# Patient Record
Sex: FEMALE | Race: WHITE | Employment: UNEMPLOYED | URBAN - METROPOLITAN AREA
[De-identification: names, ages, dates, MRNs, and addresses within clinical notes are randomized per-mention and may not be internally consistent; named-entity substitution may affect disease eponyms.]

---

## 2011-04-05 ASSESSMENT — PATIENT HEALTH QUESTIONNAIRE - PHQ9: SUM OF ALL RESPONSES TO PHQ QUESTIONS 1-9: 11

## 2017-02-13 ENCOUNTER — COMMUNICATION - HEALTHEAST (OUTPATIENT)
Dept: FAMILY MEDICINE | Facility: CLINIC | Age: 57
End: 2017-02-13

## 2017-02-13 DIAGNOSIS — F32.5 MAJOR DEPRESSIVE DISORDER, SINGLE EPISODE IN FULL REMISSION (H): ICD-10-CM

## 2017-02-13 DIAGNOSIS — F41.1 GENERALIZED ANXIETY DISORDER: ICD-10-CM

## 2017-02-14 ENCOUNTER — COMMUNICATION - HEALTHEAST (OUTPATIENT)
Dept: FAMILY MEDICINE | Facility: CLINIC | Age: 57
End: 2017-02-14

## 2017-02-14 DIAGNOSIS — F41.1 GENERALIZED ANXIETY DISORDER: ICD-10-CM

## 2017-02-27 ENCOUNTER — OFFICE VISIT - HEALTHEAST (OUTPATIENT)
Dept: FAMILY MEDICINE | Facility: CLINIC | Age: 57
End: 2017-02-27

## 2017-02-27 DIAGNOSIS — F41.1 GENERALIZED ANXIETY DISORDER: ICD-10-CM

## 2017-02-27 DIAGNOSIS — Z00.00 ROUTINE GENERAL MEDICAL EXAMINATION AT A HEALTH CARE FACILITY: ICD-10-CM

## 2017-02-27 DIAGNOSIS — F32.5 MAJOR DEPRESSIVE DISORDER, SINGLE EPISODE IN FULL REMISSION (H): ICD-10-CM

## 2017-02-27 DIAGNOSIS — E55.9 VITAMIN D DEFICIENCY: ICD-10-CM

## 2017-02-27 DIAGNOSIS — K51.90 ULCERATIVE COLITIS (H): ICD-10-CM

## 2017-02-27 DIAGNOSIS — M81.0 OSTEOPOROSIS: ICD-10-CM

## 2017-02-27 LAB
CHOLEST SERPL-MCNC: 225 MG/DL
FASTING STATUS PATIENT QL REPORTED: YES
HDLC SERPL-MCNC: 50 MG/DL
LDLC SERPL CALC-MCNC: 141 MG/DL
TRIGL SERPL-MCNC: 171 MG/DL

## 2017-02-27 ASSESSMENT — MIFFLIN-ST. JEOR: SCORE: 1217.25

## 2017-02-28 ENCOUNTER — COMMUNICATION - HEALTHEAST (OUTPATIENT)
Dept: FAMILY MEDICINE | Facility: CLINIC | Age: 57
End: 2017-02-28

## 2017-03-09 ENCOUNTER — RECORDS - HEALTHEAST (OUTPATIENT)
Dept: ADMINISTRATIVE | Facility: OTHER | Age: 57
End: 2017-03-09

## 2017-03-09 ENCOUNTER — RECORDS - HEALTHEAST (OUTPATIENT)
Dept: BONE DENSITY | Facility: CLINIC | Age: 57
End: 2017-03-09

## 2017-03-09 DIAGNOSIS — M81.0 AGE-RELATED OSTEOPOROSIS WITHOUT CURRENT PATHOLOGICAL FRACTURE: ICD-10-CM

## 2017-03-15 ENCOUNTER — COMMUNICATION - HEALTHEAST (OUTPATIENT)
Dept: FAMILY MEDICINE | Facility: CLINIC | Age: 57
End: 2017-03-15

## 2017-03-15 DIAGNOSIS — F41.1 GENERALIZED ANXIETY DISORDER: ICD-10-CM

## 2017-03-17 ENCOUNTER — COMMUNICATION - HEALTHEAST (OUTPATIENT)
Dept: FAMILY MEDICINE | Facility: CLINIC | Age: 57
End: 2017-03-17

## 2017-03-17 DIAGNOSIS — F41.1 GENERALIZED ANXIETY DISORDER: ICD-10-CM

## 2017-03-17 DIAGNOSIS — F32.5 MAJOR DEPRESSIVE DISORDER, SINGLE EPISODE IN FULL REMISSION (H): ICD-10-CM

## 2017-06-19 ENCOUNTER — COMMUNICATION - HEALTHEAST (OUTPATIENT)
Dept: FAMILY MEDICINE | Facility: CLINIC | Age: 57
End: 2017-06-19

## 2017-06-19 DIAGNOSIS — F32.5 MAJOR DEPRESSIVE DISORDER, SINGLE EPISODE IN FULL REMISSION (H): ICD-10-CM

## 2017-06-19 DIAGNOSIS — F41.1 GENERALIZED ANXIETY DISORDER: ICD-10-CM

## 2017-08-28 ENCOUNTER — OFFICE VISIT - HEALTHEAST (OUTPATIENT)
Dept: FAMILY MEDICINE | Facility: CLINIC | Age: 57
End: 2017-08-28

## 2017-08-28 DIAGNOSIS — R05.9 COUGH: ICD-10-CM

## 2017-09-05 ENCOUNTER — COMMUNICATION - HEALTHEAST (OUTPATIENT)
Dept: FAMILY MEDICINE | Facility: CLINIC | Age: 57
End: 2017-09-05

## 2017-09-05 DIAGNOSIS — F41.1 GENERALIZED ANXIETY DISORDER: ICD-10-CM

## 2017-10-30 ENCOUNTER — COMMUNICATION - HEALTHEAST (OUTPATIENT)
Dept: FAMILY MEDICINE | Facility: CLINIC | Age: 57
End: 2017-10-30

## 2017-10-30 DIAGNOSIS — F32.5 MAJOR DEPRESSIVE DISORDER, SINGLE EPISODE IN FULL REMISSION (H): ICD-10-CM

## 2017-10-30 DIAGNOSIS — F41.1 GENERALIZED ANXIETY DISORDER: ICD-10-CM

## 2018-03-13 ENCOUNTER — COMMUNICATION - HEALTHEAST (OUTPATIENT)
Dept: FAMILY MEDICINE | Facility: CLINIC | Age: 58
End: 2018-03-13

## 2018-03-13 DIAGNOSIS — F41.1 GENERALIZED ANXIETY DISORDER: ICD-10-CM

## 2018-04-16 ENCOUNTER — COMMUNICATION - HEALTHEAST (OUTPATIENT)
Dept: FAMILY MEDICINE | Facility: CLINIC | Age: 58
End: 2018-04-16

## 2018-04-16 DIAGNOSIS — F41.1 GENERALIZED ANXIETY DISORDER: ICD-10-CM

## 2018-04-26 ENCOUNTER — COMMUNICATION - HEALTHEAST (OUTPATIENT)
Dept: FAMILY MEDICINE | Facility: CLINIC | Age: 58
End: 2018-04-26

## 2018-04-26 DIAGNOSIS — F41.1 GENERALIZED ANXIETY DISORDER: ICD-10-CM

## 2018-04-26 DIAGNOSIS — F32.5 MAJOR DEPRESSIVE DISORDER, SINGLE EPISODE IN FULL REMISSION (H): ICD-10-CM

## 2018-05-29 ENCOUNTER — COMMUNICATION - HEALTHEAST (OUTPATIENT)
Dept: FAMILY MEDICINE | Facility: CLINIC | Age: 58
End: 2018-05-29

## 2018-05-29 DIAGNOSIS — F41.1 GENERALIZED ANXIETY DISORDER: ICD-10-CM

## 2018-07-02 ENCOUNTER — COMMUNICATION - HEALTHEAST (OUTPATIENT)
Dept: FAMILY MEDICINE | Facility: CLINIC | Age: 58
End: 2018-07-02

## 2018-07-02 DIAGNOSIS — F41.1 GENERALIZED ANXIETY DISORDER: ICD-10-CM

## 2018-07-26 ENCOUNTER — COMMUNICATION - HEALTHEAST (OUTPATIENT)
Dept: FAMILY MEDICINE | Facility: CLINIC | Age: 58
End: 2018-07-26

## 2018-07-26 DIAGNOSIS — F41.1 GENERALIZED ANXIETY DISORDER: ICD-10-CM

## 2018-07-26 DIAGNOSIS — F32.5 MAJOR DEPRESSIVE DISORDER, SINGLE EPISODE IN FULL REMISSION (H): ICD-10-CM

## 2018-10-30 ENCOUNTER — COMMUNICATION - HEALTHEAST (OUTPATIENT)
Dept: FAMILY MEDICINE | Facility: CLINIC | Age: 58
End: 2018-10-30

## 2018-10-30 DIAGNOSIS — F41.1 GENERALIZED ANXIETY DISORDER: ICD-10-CM

## 2018-10-30 DIAGNOSIS — F32.5 MAJOR DEPRESSIVE DISORDER, SINGLE EPISODE IN FULL REMISSION (H): ICD-10-CM

## 2018-12-19 ENCOUNTER — OFFICE VISIT - HEALTHEAST (OUTPATIENT)
Dept: FAMILY MEDICINE | Facility: CLINIC | Age: 58
End: 2018-12-19

## 2018-12-19 DIAGNOSIS — Z12.31 VISIT FOR SCREENING MAMMOGRAM: ICD-10-CM

## 2018-12-19 DIAGNOSIS — J06.9 VIRAL URI: ICD-10-CM

## 2018-12-19 LAB — DEPRECATED S PYO AG THROAT QL EIA: NORMAL

## 2018-12-19 ASSESSMENT — MIFFLIN-ST. JEOR: SCORE: 1273.04

## 2018-12-20 LAB — GROUP A STREP BY PCR: NORMAL

## 2019-06-06 ENCOUNTER — OFFICE VISIT - HEALTHEAST (OUTPATIENT)
Dept: FAMILY MEDICINE | Facility: CLINIC | Age: 59
End: 2019-06-06

## 2019-06-06 DIAGNOSIS — D22.71: ICD-10-CM

## 2019-06-06 DIAGNOSIS — L82.1 SEBORRHEIC KERATOSIS: ICD-10-CM

## 2019-06-06 DIAGNOSIS — F41.1 GENERALIZED ANXIETY DISORDER: ICD-10-CM

## 2019-06-06 DIAGNOSIS — F32.5 MAJOR DEPRESSIVE DISORDER, SINGLE EPISODE IN FULL REMISSION (H): ICD-10-CM

## 2019-06-06 DIAGNOSIS — K51.90 ULCERATIVE COLITIS WITHOUT COMPLICATIONS, UNSPECIFIED LOCATION (H): ICD-10-CM

## 2019-06-06 ASSESSMENT — MIFFLIN-ST. JEOR: SCORE: 1293

## 2019-12-24 ENCOUNTER — COMMUNICATION - HEALTHEAST (OUTPATIENT)
Dept: FAMILY MEDICINE | Facility: CLINIC | Age: 59
End: 2019-12-24

## 2019-12-24 DIAGNOSIS — F41.1 GENERALIZED ANXIETY DISORDER: ICD-10-CM

## 2020-01-22 ENCOUNTER — COMMUNICATION - HEALTHEAST (OUTPATIENT)
Dept: FAMILY MEDICINE | Facility: CLINIC | Age: 60
End: 2020-01-22

## 2020-01-22 DIAGNOSIS — F41.1 GENERALIZED ANXIETY DISORDER: ICD-10-CM

## 2020-03-18 ENCOUNTER — NURSE TRIAGE (OUTPATIENT)
Dept: NURSING | Facility: CLINIC | Age: 60
End: 2020-03-18

## 2020-03-18 ENCOUNTER — COMMUNICATION - HEALTHEAST (OUTPATIENT)
Dept: SCHEDULING | Facility: CLINIC | Age: 60
End: 2020-03-18

## 2020-03-23 ENCOUNTER — COMMUNICATION - HEALTHEAST (OUTPATIENT)
Dept: FAMILY MEDICINE | Facility: CLINIC | Age: 60
End: 2020-03-23

## 2020-03-31 ENCOUNTER — OFFICE VISIT - HEALTHEAST (OUTPATIENT)
Dept: FAMILY MEDICINE | Facility: CLINIC | Age: 60
End: 2020-03-31

## 2020-03-31 DIAGNOSIS — F41.1 GENERALIZED ANXIETY DISORDER: ICD-10-CM

## 2020-03-31 DIAGNOSIS — G47.00 INSOMNIA, UNSPECIFIED TYPE: ICD-10-CM

## 2020-03-31 DIAGNOSIS — F32.5 MAJOR DEPRESSIVE DISORDER, SINGLE EPISODE IN FULL REMISSION (H): ICD-10-CM

## 2020-03-31 ASSESSMENT — PATIENT HEALTH QUESTIONNAIRE - PHQ9: SUM OF ALL RESPONSES TO PHQ QUESTIONS 1-9: 4

## 2020-06-08 ENCOUNTER — COMMUNICATION - HEALTHEAST (OUTPATIENT)
Dept: FAMILY MEDICINE | Facility: CLINIC | Age: 60
End: 2020-06-08

## 2020-06-08 DIAGNOSIS — G47.00 INSOMNIA, UNSPECIFIED TYPE: ICD-10-CM

## 2020-06-19 ENCOUNTER — COMMUNICATION - HEALTHEAST (OUTPATIENT)
Dept: FAMILY MEDICINE | Facility: CLINIC | Age: 60
End: 2020-06-19

## 2020-06-19 DIAGNOSIS — G47.00 INSOMNIA, UNSPECIFIED TYPE: ICD-10-CM

## 2020-06-26 ENCOUNTER — COMMUNICATION - HEALTHEAST (OUTPATIENT)
Dept: FAMILY MEDICINE | Facility: CLINIC | Age: 60
End: 2020-06-26

## 2020-06-26 DIAGNOSIS — G47.00 INSOMNIA, UNSPECIFIED TYPE: ICD-10-CM

## 2020-06-30 ENCOUNTER — COMMUNICATION - HEALTHEAST (OUTPATIENT)
Dept: FAMILY MEDICINE | Facility: CLINIC | Age: 60
End: 2020-06-30

## 2020-07-20 ENCOUNTER — COMMUNICATION - HEALTHEAST (OUTPATIENT)
Dept: SCHEDULING | Facility: CLINIC | Age: 60
End: 2020-07-20

## 2020-07-20 ENCOUNTER — OFFICE VISIT - HEALTHEAST (OUTPATIENT)
Dept: FAMILY MEDICINE | Facility: CLINIC | Age: 60
End: 2020-07-20

## 2020-07-20 ENCOUNTER — COMMUNICATION - HEALTHEAST (OUTPATIENT)
Dept: FAMILY MEDICINE | Facility: CLINIC | Age: 60
End: 2020-07-20

## 2020-07-20 DIAGNOSIS — Z12.31 VISIT FOR SCREENING MAMMOGRAM: ICD-10-CM

## 2020-07-20 DIAGNOSIS — N39.0 URINARY TRACT INFECTION WITHOUT HEMATURIA, SITE UNSPECIFIED: ICD-10-CM

## 2020-07-20 DIAGNOSIS — R26.9 GAIT DISTURBANCE: ICD-10-CM

## 2020-07-20 DIAGNOSIS — E55.9 VITAMIN D DEFICIENCY: ICD-10-CM

## 2020-07-20 DIAGNOSIS — R32 URINARY INCONTINENCE, UNSPECIFIED TYPE: ICD-10-CM

## 2020-07-20 DIAGNOSIS — M62.81 PROXIMAL MUSCLE WEAKNESS: ICD-10-CM

## 2020-07-20 LAB
ALBUMIN SERPL-MCNC: 3.6 G/DL (ref 3.5–5)
ALBUMIN UR-MCNC: NEGATIVE MG/DL
ALP SERPL-CCNC: 85 U/L (ref 45–120)
ALT SERPL W P-5'-P-CCNC: <9 U/L (ref 0–45)
ANION GAP SERPL CALCULATED.3IONS-SCNC: 11 MMOL/L (ref 5–18)
APPEARANCE UR: ABNORMAL
AST SERPL W P-5'-P-CCNC: 13 U/L (ref 0–40)
BACTERIA #/AREA URNS HPF: ABNORMAL HPF
BILIRUB SERPL-MCNC: 0.1 MG/DL (ref 0–1)
BILIRUB UR QL STRIP: NEGATIVE
BUN SERPL-MCNC: 21 MG/DL (ref 8–22)
CALCIUM SERPL-MCNC: 9.2 MG/DL (ref 8.5–10.5)
CHLORIDE BLD-SCNC: 103 MMOL/L (ref 98–107)
CO2 SERPL-SCNC: 25 MMOL/L (ref 22–31)
COLOR UR AUTO: YELLOW
CREAT SERPL-MCNC: 0.77 MG/DL (ref 0.6–1.1)
ERYTHROCYTE [DISTWIDTH] IN BLOOD BY AUTOMATED COUNT: 13 % (ref 11–14.5)
ERYTHROCYTE [SEDIMENTATION RATE] IN BLOOD BY WESTERGREN METHOD: 16 MM/HR (ref 0–20)
GFR SERPL CREATININE-BSD FRML MDRD: >60 ML/MIN/1.73M2
GLUCOSE BLD-MCNC: 97 MG/DL (ref 70–125)
GLUCOSE UR STRIP-MCNC: NEGATIVE MG/DL
HCT VFR BLD AUTO: 41.2 % (ref 35–47)
HGB BLD-MCNC: 13.4 G/DL (ref 12–16)
HGB UR QL STRIP: NEGATIVE
KETONES UR STRIP-MCNC: ABNORMAL MG/DL
LEUKOCYTE ESTERASE UR QL STRIP: ABNORMAL
MAGNESIUM SERPL-MCNC: 1.9 MG/DL (ref 1.8–2.6)
MCH RBC QN AUTO: 31.8 PG (ref 27–34)
MCHC RBC AUTO-ENTMCNC: 32.5 G/DL (ref 32–36)
MCV RBC AUTO: 98 FL (ref 80–100)
MUCOUS THREADS #/AREA URNS LPF: ABNORMAL LPF
NITRATE UR QL: NEGATIVE
PH UR STRIP: 5.5 [PH] (ref 4.5–8)
PLATELET # BLD AUTO: 301 THOU/UL (ref 140–440)
PMV BLD AUTO: 12.2 FL (ref 8.5–12.5)
POTASSIUM BLD-SCNC: 4.4 MMOL/L (ref 3.5–5)
PROT SERPL-MCNC: 7.1 G/DL (ref 6–8)
RBC # BLD AUTO: 4.21 MILL/UL (ref 3.8–5.4)
RBC #/AREA URNS AUTO: ABNORMAL HPF
SODIUM SERPL-SCNC: 139 MMOL/L (ref 136–145)
SP GR UR STRIP: 1.02 (ref 1–1.03)
SQUAMOUS #/AREA URNS AUTO: ABNORMAL LPF
TSH SERPL DL<=0.005 MIU/L-ACNC: 2.9 UIU/ML (ref 0.3–5)
UROBILINOGEN UR STRIP-ACNC: ABNORMAL
VIT B12 SERPL-MCNC: 532 PG/ML (ref 213–816)
WBC #/AREA URNS AUTO: ABNORMAL HPF
WBC: 11.7 THOU/UL (ref 4–11)

## 2020-07-20 ASSESSMENT — MIFFLIN-ST. JEOR: SCORE: 1223.15

## 2020-07-21 LAB
25(OH)D3 SERPL-MCNC: 23.2 NG/ML (ref 30–80)
25(OH)D3 SERPL-MCNC: 23.2 NG/ML (ref 30–80)
B BURGDOR IGG+IGM SER QL: 0.05 INDEX VALUE
T PALLIDUM AB SER QL: NEGATIVE

## 2020-07-22 ENCOUNTER — COMMUNICATION - HEALTHEAST (OUTPATIENT)
Dept: FAMILY MEDICINE | Facility: CLINIC | Age: 60
End: 2020-07-22

## 2020-07-22 LAB — BACTERIA SPEC CULT: ABNORMAL

## 2020-07-27 ENCOUNTER — COMMUNICATION - HEALTHEAST (OUTPATIENT)
Dept: SCHEDULING | Facility: CLINIC | Age: 60
End: 2020-07-27

## 2020-07-27 ENCOUNTER — NURSE TRIAGE (OUTPATIENT)
Dept: NURSING | Facility: CLINIC | Age: 60
End: 2020-07-27

## 2020-07-27 NOTE — TELEPHONE ENCOUNTER
7/20/20 Seen pcp Dr. Mcdonough in [facility] chart for difficulty swallowing.        7/20/20 4:38 PM   Katia Connors,     Dr. Ave Mcdonough has placed an order for you to see [facility] Mammography, [facility] Imaging/MRI and [facility] Neurology. An order has been sent to [facility] Mammography, [facility] Imaging and [facility] Neurology. They will be reaching out to you to schedule these appointments however if you do not hear from them within 5-7 business days, feel free to give them a call to schedule. Their contact number is [phone] ([facility] Mammmography and Imaging) and [phone] ([facility] Neurology).      Thank you,     [facility] Clinic Staff    Patient is also scheduled with [facility] for Aug 3, 2020 [facility] MRI Brain w/wo contrast    Pamella WANG [facility] Nurse Advisor     intermittent/gradual onset

## 2020-08-03 ENCOUNTER — HOSPITAL ENCOUNTER (OUTPATIENT)
Dept: MRI IMAGING | Facility: CLINIC | Age: 60
Discharge: HOME | End: 2020-08-03
Attending: FAMILY MEDICINE

## 2020-08-03 DIAGNOSIS — M62.81 PROXIMAL MUSCLE WEAKNESS: ICD-10-CM

## 2020-08-03 DIAGNOSIS — R26.9 GAIT DISTURBANCE: ICD-10-CM

## 2020-08-05 ENCOUNTER — AMBULATORY - HEALTHEAST (OUTPATIENT)
Dept: NEUROLOGY | Facility: CLINIC | Age: 60
End: 2020-08-05

## 2020-08-05 ENCOUNTER — RECORDS - HEALTHEAST (OUTPATIENT)
Dept: ADMINISTRATIVE | Facility: OTHER | Age: 60
End: 2020-08-05

## 2020-08-05 ENCOUNTER — OFFICE VISIT (OUTPATIENT)
Dept: NEUROLOGY | Facility: CLINIC | Age: 60
End: 2020-08-05
Payer: COMMERCIAL

## 2020-08-05 VITALS — BODY MASS INDEX: 22.5 KG/M2 | WEIGHT: 140 LBS | HEIGHT: 66 IN

## 2020-08-05 DIAGNOSIS — R41.3 MEMORY DIFFICULTIES: ICD-10-CM

## 2020-08-05 DIAGNOSIS — R90.89 ABNORMAL BRAIN MRI: Primary | ICD-10-CM

## 2020-08-05 DIAGNOSIS — G50.1 ATYPICAL FACIAL PAIN: ICD-10-CM

## 2020-08-05 DIAGNOSIS — Z11.59 ENCOUNTER FOR SCREENING FOR OTHER VIRAL DISEASES: ICD-10-CM

## 2020-08-05 DIAGNOSIS — G35 MULTIPLE SCLEROSIS (H): ICD-10-CM

## 2020-08-05 DIAGNOSIS — F32.5 MAJOR DEPRESSIVE DISORDER, SINGLE EPISODE IN FULL REMISSION (H): ICD-10-CM

## 2020-08-05 PROBLEM — E55.9 VITAMIN D DEFICIENCY: Status: ACTIVE | Noted: 2020-08-05

## 2020-08-05 PROCEDURE — 99205 OFFICE O/P NEW HI 60 MIN: CPT | Mod: 95 | Performed by: PSYCHIATRY & NEUROLOGY

## 2020-08-05 RX ORDER — ZINC GLUCONATE 50 MG
50 TABLET ORAL DAILY
COMMUNITY

## 2020-08-05 RX ORDER — VENLAFAXINE HYDROCHLORIDE 75 MG/1
150 CAPSULE, EXTENDED RELEASE ORAL DAILY
COMMUNITY
Start: 2020-03-31

## 2020-08-05 RX ORDER — NAPROXEN SODIUM 220 MG
TABLET ORAL
COMMUNITY

## 2020-08-05 RX ORDER — BUPROPION HYDROCHLORIDE 150 MG/1
TABLET, EXTENDED RELEASE ORAL
COMMUNITY
Start: 2020-03-31

## 2020-08-05 RX ORDER — TRAZODONE HYDROCHLORIDE 50 MG/1
TABLET, FILM COATED ORAL
COMMUNITY
Start: 2020-06-28

## 2020-08-05 ASSESSMENT — MIFFLIN-ST. JEOR: SCORE: 1226.79

## 2020-08-05 NOTE — PROGRESS NOTES
NEUROLOGY NOTE        Assessment/Plan        Abnormal brain MRI.  Most suggestive of multiple sclerosis.  Has a family history of multiple sclerosis in her mom.    Memory difficulties    New headaches, atypical facial pain.    Recurrent major depression in control now.    Recommendations:  MRI of thoracic and cervical spine with and without contrast  Neuropsych testing  Blood tests of thiamine and HIV to rule out other complicating factors.  Follow-up in about 6 weeks when all results are available.      This is a virtual visit due to COVID-19 Pandemic to mitigate potential disease spreading. Consent obtained for charge with this visit. Total time spent about 40 minutes.           SUBJECTIVE       Dory Abrams is a 60 year old female seen for abnormal brain MRI.      Over the past 1 year since 2019, she has been experiencing gait difficulty and poor balance.  She has been falling total of 5-6 times from February to August 2020 mainly due to imbalance.  Only 1 time she felt dizzy and lightheaded feeling like she is going to pass out otherwise no loss of consciousness.  Other times there is no warning, she falls mainly due to lack of balance.  Sometimes her arms and legs move spontaneously without her control.  She has poor coordination and some tremors in hands.  She feels the symptoms every day.  No focal weakness or numbness.  No vision changes.  No loss of vision.  No focal weakness or sensory complaints.  Does not vaguely feel lightheaded or dizzy when standing up.    Headaches noted since February 2020: She has some ice headache across the nasal bridge between her eyes.  It comes and goes no time pattern or position changes.  No trigger.  No nausea vomiting, no sensitivity to light or sound.  Sometimes lasting up to hours.    Memory difficulty since 2019.  Her brain not working as good as in the past.  She forgets things and loses train of thoughts.  No issue to manage her daily routine yet.  She is laid  "off since January when her company closed down.  She was a manager.    History of major depression under control on venlafaxine and Wellbutrin.  No suicidal ideation.  She also has anxiety she feels controlled.    He smokes 5 cigarettes a day.  No alcohol abuse.    Her mom has a history of multiple sclerosis.    She is eating healthy and maintained pretty active taking care of herself.  Sleeping is okay.    Her lipids were a little elevated in 2017.    Result Impression8/2/2020   1.  No evidence of acute intracranial hemorrhage, mass effect, or infarction.  2.  Extensive focal and confluent areas of signal abnormality within the cerebral hemispheric white matter and within the brainstem which are nonspecific, though most commonly ascribed to chronic small vessel ischemic disease, more than expected for age.   Other diagnostic considerations include sequela of migraine headaches or multiple sclerosis. Although multiple sclerosis is on the differential, the distribution of signal abnormality do not favor multiple sclerosis though isn't entirely excluded based   on imaging alone.   3.  Mild brain parenchymal volume loss.     In 7/2020 had a blood tests including Lyme antibody cascade, sh, liver function syphilis antibody, BMP, CBC and UA unremarkable.  Vitamin D was a little low.         Review of system     10 point system review otherwise unremarkable    PHYSICAL EXAMINATION     Vital signs in last 24 hours:  Vitals:    08/05/20 1141   Weight: 63.5 kg (140 lb)   Height: 1.676 m (5' 6\")       She seems to have normal mental status, language and speech.  Well-developed and nourished.  Atraumatic.  Exam of the head, neck, eyes, ears, nose and mouth are adequate.  Cranial nerves II through XII largely intact.  She has poor coordination.  Reports no focal weakness.  Not able to do other assessment with virtual visit.        Problem List     Patient Active Problem List    Diagnosis Date Noted     Major depressive disorder, " single episode in full remission (H) 08/05/2020     Priority: Medium     Created by Conversion       Vitamin D deficiency 08/05/2020     Priority: Medium     Created by Conversion    Replacement Utility updated for latest IMO load       Osteoporosis 02/19/2015     Priority: Medium         Past medical history     Depression,anxiety    Family history     Family History   Problem Relation Age of Onset     Diabetes Father      Diabetes Sister      Mother:MS    Social history     Social History     Socioeconomic History     Marital status:      Spouse name: Not on file     Number of children: Not on file     Years of education: Not on file     Highest education level: Not on file   Occupational History     Not on file   Social Needs     Financial resource strain: Not on file     Food insecurity     Worry: Not on file     Inability: Not on file     Transportation needs     Medical: Not on file     Non-medical: Not on file   Tobacco Use     Smoking status: Current Every Day Smoker     Packs/day: 0.25     Years: 41.00     Pack years: 10.25     Smokeless tobacco: Never Used   Substance and Sexual Activity     Alcohol use: Not on file     Drug use: Not on file     Sexual activity: Not on file   Lifestyle     Physical activity     Days per week: Not on file     Minutes per session: Not on file     Stress: Not on file   Relationships     Social connections     Talks on phone: Not on file     Gets together: Not on file     Attends Jew service: Not on file     Active member of club or organization: Not on file     Attends meetings of clubs or organizations: Not on file     Relationship status: Not on file     Intimate partner violence     Fear of current or ex partner: Not on file     Emotionally abused: Not on file     Physically abused: Not on file     Forced sexual activity: Not on file   Other Topics Concern     Parent/sibling w/ CABG, MI or angioplasty before 65F 55M? Not Asked   Social History Narrative      Not on file         Allergy     Robitussin night time and Aspirin    MEDICATIONS List     Current Outpatient Medications   Medication Sig Dispense Refill     Ascorbic Acid (VITAMIN C PO) Take 500 mg by mouth       buPROPion (WELLBUTRIN SR) 150 MG 12 hr tablet TAKE 1 TABLET BY MOUTH TWICE DAILY. OFFICE VISIT REQUIRED FOR FURTHER REFILLS       calcium carbonate-vitamin D (OS-JEANETTE) 500-400 MG-UNIT tablet Take 1 tablet by mouth daily       Cholecalciferol (VITAMIN D3 PO) Take 2,000 Units by mouth       GLUCOSAMINE-CHONDROITIN PO        naproxen sodium (ANAPROX) 220 MG tablet as needed.       traZODone (DESYREL) 50 MG tablet Take between 1-2 tablets ( mg) by mouth at bedtime.       venlafaxine (EFFEXOR-XR) 75 MG 24 hr capsule Take 150 mg by mouth daily       zinc gluconate 50 MG tablet Take 50 mg by mouth daily                     Johanna Padron MD, MD, PhD  Neurology   Office tel: [phone]

## 2020-08-05 NOTE — LETTER
8/5/2020         RE: Dory Abrams  [address]  [place] [address]        Dear Colleague,    Thank you for referring your patient, Dory Abrams, to the [facility] [place]. Please see a copy of my visit note below.        NEUROLOGY NOTE        Assessment/Plan        Abnormal brain MRI.  Most suggestive of multiple sclerosis.  Has a family history of multiple sclerosis in her mom.    Memory difficulties    New headaches, atypical facial pain.    Recurrent major depression in control now.    Recommendations:  MRI of thoracic and cervical spine with and without contrast  Neuropsych testing  Blood tests of thiamine and HIV to rule out other complicating factors.  Follow-up in about 6 weeks when all results are available.      This is a virtual visit due to COVID-19 Pandemic to mitigate potential disease spreading. Consent obtained for charge with this visit. Total time spent about 40 minutes.           SUBJECTIVE       Dory Abrams is a 60 year old female seen for abnormal brain MRI.      Over the past 1 year since 2019, she has been experiencing gait difficulty and poor balance.  She has been falling total of 5-6 times from February to August 2020 mainly due to imbalance.  Only 1 time she felt dizzy and lightheaded feeling like she is going to pass out otherwise no loss of consciousness.  Other times there is no warning, she falls mainly due to lack of balance.  Sometimes her arms and legs move spontaneously without her control.  She has poor coordination and some tremors in hands.  She feels the symptoms every day.  No focal weakness or numbness.  No vision changes.  No loss of vision.  No focal weakness or sensory complaints.  Does not vaguely feel lightheaded or dizzy when standing up.    Headaches noted since February 2020: She has some ice headache across the nasal bridge between her eyes.  It comes and goes no time pattern or position changes.  No trigger.  No nausea  "vomiting, no sensitivity to light or sound.  Sometimes lasting up to hours.    Memory difficulty since 2019.  Her brain not working as good as in the past.  She forgets things and loses train of thoughts.  No issue to manage her daily routine yet.  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    History of major depression under control on venlafaxine and Wellbutrin.  No suicidal ideation.  She also has anxiety she feels controlled.    He smokes 5 cigarettes a day.  No alcohol abuse.    Her mom has a history of multiple sclerosis.    She is eating healthy and maintained pretty active taking care of herself.  Sleeping is okay.    Her lipids were a little elevated in 2017.    Result Impression8/2/2020   1.  No evidence of acute intracranial hemorrhage, mass effect, or infarction.  2.  Extensive focal and confluent areas of signal abnormality within the cerebral hemispheric white matter and within the brainstem which are nonspecific, though most commonly ascribed to chronic small vessel ischemic disease, more than expected for age.   Other diagnostic considerations include sequela of migraine headaches or multiple sclerosis. Although multiple sclerosis is on the differential, the distribution of signal abnormality do not favor multiple sclerosis though isn't entirely excluded based   on imaging alone.   3.  Mild brain parenchymal volume loss.     In 7/2020 had a blood tests including Lyme antibody cascade, sh, liver function syphilis antibody, BMP, CBC and UA unremarkable.  Vitamin D was a little low.         Review of system     10 point system review otherwise unremarkable    PHYSICAL EXAMINATION     Vital signs in last 24 hours:  Vitals:    08/05/20 1141   Weight: 63.5 kg (140 lb)   Height: 1.676 m (5' 6\")       She seems to have normal mental status, language and speech.  Well-developed and nourished.  Atraumatic.  Exam of the head, neck, eyes, ears, nose and mouth are adequate.  " Cranial nerves II through XII largely intact.  She has poor coordination.  Reports no focal weakness.  Not able to do other assessment with virtual visit.        Problem List     Patient Active Problem List    Diagnosis Date Noted     Major depressive disorder, single episode in full remission (H) 08/05/2020     Priority: Medium     Created by Conversion       Vitamin D deficiency 08/05/2020     Priority: Medium     Created by Conversion    Replacement Utility updated for latest IMO load       Osteoporosis 02/19/2015     Priority: Medium         Past medical history     Depression,anxiety    Family history     Family History   Problem Relation Age of Onset     Diabetes Father      Diabetes Sister      Mother:MS    Social history     Social History     Socioeconomic History     Marital status:      Spouse name: Not on file     Number of children: Not on file     Years of education: Not on file     Highest education level: Not on file   Occupational History     Not on file   Social Needs     Financial resource strain: Not on file     Food insecurity     Worry: Not on file     Inability: Not on file     Transportation needs     Medical: Not on file     Non-medical: Not on file   Tobacco Use     Smoking status: Current Every Day Smoker     Packs/day: 0.25     Years: 41.00     Pack years: 10.25     Smokeless tobacco: Never Used   Substance and Sexual Activity     Alcohol use: Not on file     Drug use: Not on file     Sexual activity: Not on file   Lifestyle     Physical activity     Days per week: Not on file     Minutes per session: Not on file     Stress: Not on file   Relationships     Social connections     Talks on phone: Not on file     Gets together: Not on file     Attends Adventism service: Not on file     Active member of club or organization: Not on file     Attends meetings of clubs or organizations: Not on file     Relationship status: Not on file     Intimate partner violence     Fear of current or  ex partner: Not on file     Emotionally abused: Not on file     Physically abused: Not on file     Forced sexual activity: Not on file   Other Topics Concern     Parent/sibling w/ CABG, MI or angioplasty before 65F 55M? Not Asked   Social History Narrative     Not on file         Allergy     Robitussin night time and Aspirin    MEDICATIONS List     Current Outpatient Medications   Medication Sig Dispense Refill     Ascorbic Acid (VITAMIN C PO) Take 500 mg by mouth       buPROPion (WELLBUTRIN SR) 150 MG 12 hr tablet TAKE 1 TABLET BY MOUTH TWICE DAILY. OFFICE VISIT REQUIRED FOR FURTHER REFILLS       calcium carbonate-vitamin D (OS-JEANETTE) 500-400 MG-UNIT tablet Take 1 tablet by mouth daily       Cholecalciferol (VITAMIN D3 PO) Take 2,000 Units by mouth       GLUCOSAMINE-CHONDROITIN PO        naproxen sodium (ANAPROX) 220 MG tablet as needed.       traZODone (DESYREL) 50 MG tablet Take between 1-2 tablets ( mg) by mouth at bedtime.       venlafaxine (EFFEXOR-XR) 75 MG 24 hr capsule Take 150 mg by mouth daily       zinc gluconate 50 MG tablet Take 50 mg by mouth daily                     Johanna Padron MD, MD, PhD  Neurology   Office tel: [phone]        Again, thank you for allowing me to participate in the care of your patient.        Sincerely,        Johanna Padron MD

## 2020-08-05 NOTE — NURSING NOTE
Chief Complaint   Patient presents with     Neurologic Problem     GAIT/MUSCLE WEAKNESS     Video Visit     - VIDEO VISIT text to smart phone [phone]     Johnnie Ron MA on 8/5/2020 at 11:51 AM

## 2020-08-06 ENCOUNTER — RECORDS - HEALTHEAST (OUTPATIENT)
Dept: LAB | Facility: CLINIC | Age: 60
End: 2020-08-06

## 2020-08-06 LAB
C REACTIVE PROTEIN LHE: 0.5 MG/DL (ref 0–0.8)
ERYTHROCYTE [SEDIMENTATION RATE] IN BLOOD BY WESTERGREN METHOD: 10 MM/HR (ref 0–20)
HIV 1+2 AB+HIV1 P24 AG SERPL QL IA: NEGATIVE

## 2020-08-07 ENCOUNTER — AMBULATORY - HEALTHEAST (OUTPATIENT)
Dept: FAMILY MEDICINE | Facility: CLINIC | Age: 60
End: 2020-08-07

## 2020-08-07 ENCOUNTER — RECORDS - HEALTHEAST (OUTPATIENT)
Dept: ADMINISTRATIVE | Facility: OTHER | Age: 60
End: 2020-08-07

## 2020-08-07 DIAGNOSIS — Z11.59 ENCOUNTER FOR SCREENING FOR OTHER VIRAL DISEASES: ICD-10-CM

## 2020-08-08 LAB
SARS-COV-2 PCR COMMENT: NORMAL
SARS-COV-2 RNA SPEC QL NAA+PROBE: NEGATIVE
SARS-COV-2 VIRUS SPECIMEN SOURCE: NORMAL

## 2020-08-09 ENCOUNTER — COMMUNICATION - HEALTHEAST (OUTPATIENT)
Dept: SCHEDULING | Facility: CLINIC | Age: 60
End: 2020-08-09

## 2020-08-09 LAB — VIT B1 PYROPHOSHATE BLD-SCNC: 125 NMOL/L (ref 70–180)

## 2020-08-10 ENCOUNTER — HOSPITAL ENCOUNTER (OUTPATIENT)
Dept: RADIOLOGY | Facility: CLINIC | Age: 60
Discharge: HOME | End: 2020-08-10
Attending: PSYCHIATRY & NEUROLOGY | Admitting: RADIOLOGY

## 2020-08-10 DIAGNOSIS — G35 MS (MULTIPLE SCLEROSIS) (H): ICD-10-CM

## 2020-08-10 LAB
APPEARANCE CSF: CLEAR
COLOR CSF: COLORLESS
GLUCOSE CSF-MCNC: 64 MG/DL (ref 40–75)
PROT CSF-MCNC: 35 MG/DL (ref 15–45)
RBC # CSF MANUAL: 1 /UL
TUBE # CSF: 4
VOLUME CSF - HISTORICAL: 7 ML
WBC # CSF MANUAL: 1 /UL

## 2020-08-11 LAB — ARUP MISCELLANEOUS TEST: NORMAL

## 2020-08-12 ENCOUNTER — HOSPITAL ENCOUNTER (OUTPATIENT)
Dept: MRI IMAGING | Facility: CLINIC | Age: 60
Discharge: HOME | End: 2020-08-12
Attending: PSYCHIATRY & NEUROLOGY

## 2020-08-12 DIAGNOSIS — R90.89 ABNORMAL BRAIN MRI: ICD-10-CM

## 2020-08-12 DIAGNOSIS — G35 MS (MULTIPLE SCLEROSIS) (H): ICD-10-CM

## 2020-08-12 LAB
ALB CSF/SERPL: 5.6 RATIO (ref 0–9)
ALBUMIN CSF-MCNC: 19 MG/DL (ref 0–35)
ALBUMIN SERPL-MCNC: 3400 MG/DL (ref 3500–5200)
IGG CSF-MCNC: 3.1 MG/DL (ref 0–6)
IGG SERPL-MCNC: 1050 MG/DL (ref 768–1632)
IGG SYNTH RATE SER+CSF CALC-MRATE: <0 MG/D
IGG/ALB CLEAR SER+CSF-RTO: 0.53 RATIO (ref 0.28–0.66)
IGG/ALB CSF: 0.16 RATIO (ref 0.09–0.25)
MISCELLANEOUS TEST DEPT. - HE HISTORICAL: NORMAL
OLIGOCLONAL BANDS CSF ELPH-IMP: ABNORMAL
OLIGOCLONAL BANDS CSF ELPH-IMP: NEGATIVE
OLIGOCLONAL BANDS CSF IEF: 0 BANDS (ref 0–1)
PERFORMING LAB: NORMAL
SPECIMEN STATUS: NORMAL
TEST NAME: NORMAL

## 2020-08-14 ENCOUNTER — TELEPHONE (OUTPATIENT)
Dept: NEUROLOGY | Facility: CLINIC | Age: 60
End: 2020-08-14

## 2020-08-17 NOTE — TELEPHONE ENCOUNTER
Hi Dory Monroe called to let you she did her LP and MRI and the results are in Care Everywhere [facility].  Josefina Daley on 8/17/2020 at 3:35 PM

## 2020-08-18 NOTE — TELEPHONE ENCOUNTER
Please inform patient the CSF study, blood test and MRI spine so far unremarkable.  Will discuss in detail at next visit as scheduled.  Thank you

## 2020-08-28 ENCOUNTER — AMBULATORY - HEALTHEAST (OUTPATIENT)
Dept: BEHAVIORAL HEALTH | Facility: CLINIC | Age: 60
End: 2020-08-28

## 2020-08-28 DIAGNOSIS — R41.3 MEMORY DIFFICULTIES: ICD-10-CM

## 2020-08-28 DIAGNOSIS — R90.89 ABNORMAL BRAIN MRI: ICD-10-CM

## 2020-08-28 DIAGNOSIS — G35 MULTIPLE SCLEROSIS (H): ICD-10-CM

## 2020-09-09 ENCOUNTER — AMBULATORY - HEALTHEAST (OUTPATIENT)
Dept: BEHAVIORAL HEALTH | Facility: CLINIC | Age: 60
End: 2020-09-09

## 2020-09-09 DIAGNOSIS — G35 MULTIPLE SCLEROSIS (H): ICD-10-CM

## 2020-09-09 DIAGNOSIS — R90.89 ABNORMAL BRAIN MRI: ICD-10-CM

## 2020-09-09 DIAGNOSIS — R41.3 MEMORY DIFFICULTIES: ICD-10-CM

## 2020-09-10 ENCOUNTER — COMMUNICATION - HEALTHEAST (OUTPATIENT)
Dept: FAMILY MEDICINE | Facility: CLINIC | Age: 60
End: 2020-09-10

## 2020-09-10 DIAGNOSIS — G47.00 INSOMNIA, UNSPECIFIED TYPE: ICD-10-CM

## 2020-09-16 ENCOUNTER — RECORDS - HEALTHEAST (OUTPATIENT)
Dept: ADMINISTRATIVE | Facility: OTHER | Age: 60
End: 2020-09-16

## 2020-09-16 ENCOUNTER — OFFICE VISIT (OUTPATIENT)
Dept: NEUROLOGY | Facility: CLINIC | Age: 60
End: 2020-09-16
Payer: COMMERCIAL

## 2020-09-16 VITALS
SYSTOLIC BLOOD PRESSURE: 128 MMHG | DIASTOLIC BLOOD PRESSURE: 79 MMHG | HEART RATE: 81 BPM | WEIGHT: 140 LBS | HEIGHT: 66 IN | BODY MASS INDEX: 22.5 KG/M2

## 2020-09-16 DIAGNOSIS — R27.0 ATAXIA: Primary | ICD-10-CM

## 2020-09-16 DIAGNOSIS — R41.3 MEMORY DIFFICULTIES: ICD-10-CM

## 2020-09-16 DIAGNOSIS — G35 MULTIPLE SCLEROSIS (H): ICD-10-CM

## 2020-09-16 PROCEDURE — 99214 OFFICE O/P EST MOD 30 MIN: CPT | Performed by: PSYCHIATRY & NEUROLOGY

## 2020-09-16 ASSESSMENT — MIFFLIN-ST. JEOR: SCORE: 1226.79

## 2020-09-16 NOTE — LETTER
9/16/2020         RE: Dory bArams  [address]  [place] [address]        Dear Colleague,    Thank you for referring your patient, Dory Abrams, to the [facility] [place]. Please see a copy of my visit note below.        NEUROLOGY NOTE        Assessment/Plan          Abnormal brain MRI.  Most suggestive of multiple sclerosis.  Has a family history of multiple sclerosis in her mom.     Memory difficulties     New headaches, atypical facial pain.     Recurrent major depression in control now.     Recommendations:  Neuropsych testing  For for therapy  Follow-up in about 6 weeks    Check paraneoplastic panel,   Check Lyme and ANCA for the abnormal brain MRI finding.  Commend to discontinue zinc supplement.  Consider checking anti-shayy, vitamin E levels.  Consider a repeat MRI study in about 3 months with and without contrast.  We will see her for follow-up in about 3 months.       SUBJECTIVE           Dory Abrams is a 60 year old female seen for abnormal brain MRI.       MRI thoracic and cervical spine unremarkable.  CSF study unremarkable and negative for myelin basic protein and oligoclonal bands.  ESR CRP, vitamin B12, diamine, TSH, liver function, BMP and CBC unremarkable.    Over the past 1 year since 2019, she has been experiencing gait difficulty and poor balance.  She has been falling total of 5-6 times from February to August 2020 mainly due to imbalance.  Only 1 time she felt dizzy and lightheaded feeling like she is going to pass out otherwise no loss of consciousness.  Other times there is no warning, she falls mainly due to lack of balance.  Sometimes her arms and legs move spontaneously without her control.  She has poor coordination and some tremors in hands.  She feels the symptoms every day.  No focal weakness or numbness.  No vision changes.  No loss of vision.  No focal weakness or sensory complaints.  Does not vaguely feel lightheaded or dizzy when standing  up.     Headaches noted since February 2020: She has some ice headache across the nasal bridge between her eyes.  It comes and goes no time pattern or position changes.  No trigger.  No nausea vomiting, no sensitivity to light or sound.  Sometimes lasting up to hours.     Memory difficulty since 2019.  Her brain not working as good as in the past.  She forgets things and loses train of thoughts.  No issue to manage her daily routine yet.  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     History of major depression under control on venlafaxine and Wellbutrin.  No suicidal ideation.  She also has anxiety she feels controlled.     He smokes 5 cigarettes a day.  No alcohol abuse.     Her mom has a history of multiple sclerosis.     She is eating healthy and maintained pretty active taking care of herself.  Sleeping is okay.     Her lipids were a little elevated in 2017.     CSF 8/10 nomal. Negative oligoclonal bands.     Result Impression8/3/2020 MRI brain   1.  No evidence of acute intracranial hemorrhage, mass effect, or infarction.  2.  Extensive focal and confluent areas of signal abnormality within the cerebral hemispheric white matter and within the brainstem which are nonspecific, though most commonly ascribed to chronic small vessel ischemic disease, more than expected for age.   Other diagnostic considerations include sequela of migraine headaches or multiple sclerosis. Although multiple sclerosis is on the differential, the distribution of signal abnormality do not favor multiple sclerosis though isn't entirely excluded based   on imaging alone.   3.  Mild brain parenchymal volume loss.      In 7/2020 had a blood tests including Lyme antibody cascade, sh, liver function syphilis antibody, BMP, CBC and UA unremarkable.  Vitamin D was a little low.    Result Impression MRI C-spine   1.  No abnormal cord signal. No finding to suggest demyelination.  2.  Mild multilevel cervical  "spondylosis. No high-grade spinal canal or neural foraminal stenosis.       IMPRESSION: MRI T-spine  1.  No abnormal cord signal. No finding to suggest demyelination.  2.  No significant spinal canal or neural foraminal stenosis.           Review of system     10 point system review otherwise unremarkable    PHYSICAL EXAMINATION     Vital signs in last 24 hours:  Vitals:    09/16/20 0946   BP: 128/79   Pulse: 81   Weight: 63.5 kg (140 lb)   Height: 1.676 m (5' 6\")     No acute distress sitting in chair.  Normal mental status language and adequate speech.  Atraumatic normocephalic.  Mood is fine.  Cognitive function appropriate.  Exam of the head, neck, eyes, ears, nose and mouth negative.  No edema or skin rashes.  Cranial nerves II through XII intact.  Adequate muscle bulk and strength but increased muscle tone in general more in the legs.  No clear Babinski signs.  Deep tendon reflexes brisk in bilateral ankles and knees.  Stiff and in balance gait.  Romberg sign largely intact.  Slightly compromised hand coordination with finger tapping.  And balance with turning.    Problem List     Patient Active Problem List    Diagnosis Date Noted     Major depressive disorder, single episode in full remission (H) 08/05/2020     Priority: Medium     Created by Conversion       Vitamin D deficiency 08/05/2020     Priority: Medium     Created by Conversion    Replacement Utility updated for latest IMO load       Abnormal brain MRI 08/05/2020     Priority: Medium     Multiple sclerosis (H) 08/05/2020     Priority: Medium     Memory difficulties 08/05/2020     Priority: Medium     Atypical facial pain 08/05/2020     Priority: Medium     Osteoporosis 02/19/2015     Priority: Medium         Past medical history     Poor balance.  Depression.    Family history     Family History   Problem Relation Age of Onset     Diabetes Father      Diabetes Sister      Other head multiple sclerosis    Social history     Social History "     Socioeconomic History     Marital status:      Spouse name: Not on file     Number of children: Not on file     Years of education: Not on file     Highest education level: Not on file   Occupational History     Not on file   Social Needs     Financial resource strain: Not on file     Food insecurity     Worry: Not on file     Inability: Not on file     Transportation needs     Medical: Not on file     Non-medical: Not on file   Tobacco Use     Smoking status: Current Every Day Smoker     Packs/day: 0.25     Years: 41.00     Pack years: 10.25     Smokeless tobacco: Never Used   Substance and Sexual Activity     Alcohol use: Not on file     Drug use: Not on file     Sexual activity: Not on file   Lifestyle     Physical activity     Days per week: Not on file     Minutes per session: Not on file     Stress: Not on file   Relationships     Social connections     Talks on phone: Not on file     Gets together: Not on file     Attends Congregation service: Not on file     Active member of club or organization: Not on file     Attends meetings of clubs or organizations: Not on file     Relationship status: Not on file     Intimate partner violence     Fear of current or ex partner: Not on file     Emotionally abused: Not on file     Physically abused: Not on file     Forced sexual activity: Not on file   Other Topics Concern     Parent/sibling w/ CABG, MI or angioplasty before 65F 55M? Not Asked   Social History Narrative     Not on file         Allergy     Robitussin night time and Aspirin    MEDICATIONS List     Current Outpatient Medications   Medication Sig Dispense Refill     Ascorbic Acid (VITAMIN C PO) Take 500 mg by mouth       buPROPion (WELLBUTRIN SR) 150 MG 12 hr tablet TAKE 1 TABLET BY MOUTH TWICE DAILY. OFFICE VISIT REQUIRED FOR FURTHER REFILLS       calcium carbonate-vitamin D (OS-JEANETTE) 500-400 MG-UNIT tablet Take 1 tablet by mouth daily       GLUCOSAMINE-CHONDROITIN PO        naproxen sodium (ANAPROX)  220 MG tablet as needed.       traZODone (DESYREL) 50 MG tablet Take between 1-2 tablets ( mg) by mouth at bedtime.       venlafaxine (EFFEXOR-XR) 75 MG 24 hr capsule Take 150 mg by mouth daily       zinc gluconate 50 MG tablet Take 50 mg by mouth daily                 Johanna Padron MD, MD, PhD  Neurology   Office tel: [phone]        Again, thank you for allowing me to participate in the care of your patient.        Sincerely,        Johanna Padron MD

## 2020-09-16 NOTE — PROGRESS NOTES
NEUROLOGY NOTE        Assessment/Plan          Abnormal brain MRI.  Most suggestive of multiple sclerosis.  Has a family history of multiple sclerosis in her mom.     Memory difficulties     New headaches, atypical facial pain.     Recurrent major depression in control now.     Recommendations:  Neuropsych testing  For for therapy  Follow-up in about 6 weeks    Check paraneoplastic panel,   Check Lyme and ANCA for the abnormal brain MRI finding.  Commend to discontinue zinc supplement.  Consider checking anti-shayy, vitamin E levels.  Consider a repeat MRI study in about 3 months with and without contrast.  We will see her for follow-up in about 3 months.       SUBJECTIVE           Dory Abrams is a 60 year old female seen for abnormal brain MRI.       MRI thoracic and cervical spine unremarkable.  CSF study unremarkable and negative for myelin basic protein and oligoclonal bands.  ESR CRP, vitamin B12, diamine, TSH, liver function, BMP and CBC unremarkable.    Over the past 1 year since 2019, she has been experiencing gait difficulty and poor balance.  She has been falling total of 5-6 times from February to August 2020 mainly due to imbalance.  Only 1 time she felt dizzy and lightheaded feeling like she is going to pass out otherwise no loss of consciousness.  Other times there is no warning, she falls mainly due to lack of balance.  Sometimes her arms and legs move spontaneously without her control.  She has poor coordination and some tremors in hands.  She feels the symptoms every day.  No focal weakness or numbness.  No vision changes.  No loss of vision.  No focal weakness or sensory complaints.  Does not vaguely feel lightheaded or dizzy when standing up.     Headaches noted since February 2020: She has some ice headache across the nasal bridge between her eyes.  It comes and goes no time pattern or position changes.  No trigger.  No nausea vomiting, no sensitivity to light or sound.  Sometimes lasting up  to hours.     Memory difficulty since 2019.  Her brain not working as good as in the past.  She forgets things and loses train of thoughts.  No issue to manage her daily routine yet.  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     History of major depression under control on venlafaxine and Wellbutrin.  No suicidal ideation.  She also has anxiety she feels controlled.     He smokes 5 cigarettes a day.  No alcohol abuse.     Her mom has a history of multiple sclerosis.     She is eating healthy and maintained pretty active taking care of herself.  Sleeping is okay.     Her lipids were a little elevated in 2017.     CSF 8/10 nomal. Negative oligoclonal bands.     Result Impression8/3/2020 MRI brain   1.  No evidence of acute intracranial hemorrhage, mass effect, or infarction.  2.  Extensive focal and confluent areas of signal abnormality within the cerebral hemispheric white matter and within the brainstem which are nonspecific, though most commonly ascribed to chronic small vessel ischemic disease, more than expected for age.   Other diagnostic considerations include sequela of migraine headaches or multiple sclerosis. Although multiple sclerosis is on the differential, the distribution of signal abnormality do not favor multiple sclerosis though isn't entirely excluded based   on imaging alone.   3.  Mild brain parenchymal volume loss.      In 7/2020 had a blood tests including Lyme antibody cascade, sh, liver function syphilis antibody, BMP, CBC and UA unremarkable.  Vitamin D was a little low.    Result Impression MRI C-spine   1.  No abnormal cord signal. No finding to suggest demyelination.  2.  Mild multilevel cervical spondylosis. No high-grade spinal canal or neural foraminal stenosis.       IMPRESSION: MRI T-spine  1.  No abnormal cord signal. No finding to suggest demyelination.  2.  No significant spinal canal or neural foraminal stenosis.           Review of system  "    10 point system review otherwise unremarkable    PHYSICAL EXAMINATION     Vital signs in last 24 hours:  Vitals:    09/16/20 0946   BP: 128/79   Pulse: 81   Weight: 63.5 kg (140 lb)   Height: 1.676 m (5' 6\")     No acute distress sitting in chair.  Normal mental status language and adequate speech.  Atraumatic normocephalic.  Mood is fine.  Cognitive function appropriate.  Exam of the head, neck, eyes, ears, nose and mouth negative.  No edema or skin rashes.  Cranial nerves II through XII intact.  Adequate muscle bulk and strength but increased muscle tone in general more in the legs.  No clear Babinski signs.  Deep tendon reflexes brisk in bilateral ankles and knees.  Stiff and in balance gait.  Romberg sign largely intact.  Slightly compromised hand coordination with finger tapping.  And balance with turning.    Problem List     Patient Active Problem List    Diagnosis Date Noted     Major depressive disorder, single episode in full remission (H) 08/05/2020     Priority: Medium     Created by Conversion       Vitamin D deficiency 08/05/2020     Priority: Medium     Created by Conversion    Replacement Utility updated for latest IMO load       Abnormal brain MRI 08/05/2020     Priority: Medium     Multiple sclerosis (H) 08/05/2020     Priority: Medium     Memory difficulties 08/05/2020     Priority: Medium     Atypical facial pain 08/05/2020     Priority: Medium     Osteoporosis 02/19/2015     Priority: Medium         Past medical history     Poor balance.  Depression.    Family history     Family History   Problem Relation Age of Onset     Diabetes Father      Diabetes Sister      Other head multiple sclerosis    Social history     Social History     Socioeconomic History     Marital status:      Spouse name: Not on file     Number of children: Not on file     Years of education: Not on file     Highest education level: Not on file   Occupational History     Not on file   Social Needs     Financial " resource strain: Not on file     Food insecurity     Worry: Not on file     Inability: Not on file     Transportation needs     Medical: Not on file     Non-medical: Not on file   Tobacco Use     Smoking status: Current Every Day Smoker     Packs/day: 0.25     Years: 41.00     Pack years: 10.25     Smokeless tobacco: Never Used   Substance and Sexual Activity     Alcohol use: Not on file     Drug use: Not on file     Sexual activity: Not on file   Lifestyle     Physical activity     Days per week: Not on file     Minutes per session: Not on file     Stress: Not on file   Relationships     Social connections     Talks on phone: Not on file     Gets together: Not on file     Attends Mu-ism service: Not on file     Active member of club or organization: Not on file     Attends meetings of clubs or organizations: Not on file     Relationship status: Not on file     Intimate partner violence     Fear of current or ex partner: Not on file     Emotionally abused: Not on file     Physically abused: Not on file     Forced sexual activity: Not on file   Other Topics Concern     Parent/sibling w/ CABG, MI or angioplasty before 65F 55M? Not Asked   Social History Narrative     Not on file         Allergy     Robitussin night time and Aspirin    MEDICATIONS List     Current Outpatient Medications   Medication Sig Dispense Refill     Ascorbic Acid (VITAMIN C PO) Take 500 mg by mouth       buPROPion (WELLBUTRIN SR) 150 MG 12 hr tablet TAKE 1 TABLET BY MOUTH TWICE DAILY. OFFICE VISIT REQUIRED FOR FURTHER REFILLS       calcium carbonate-vitamin D (OS-JEANETTE) 500-400 MG-UNIT tablet Take 1 tablet by mouth daily       GLUCOSAMINE-CHONDROITIN PO        naproxen sodium (ANAPROX) 220 MG tablet as needed.       traZODone (DESYREL) 50 MG tablet Take between 1-2 tablets ( mg) by mouth at bedtime.       venlafaxine (EFFEXOR-XR) 75 MG 24 hr capsule Take 150 mg by mouth daily       zinc gluconate 50 MG tablet Take 50 mg by mouth daily                  Johanna Padron MD, MD, PhD  Neurology   Office tel: [phone]

## 2020-09-17 ENCOUNTER — AMBULATORY - HEALTHEAST (OUTPATIENT)
Dept: ADMINISTRATIVE | Facility: REHABILITATION | Age: 60
End: 2020-09-17

## 2020-09-17 DIAGNOSIS — R27.0 ATAXIA: ICD-10-CM

## 2020-09-23 ENCOUNTER — COMMUNICATION - HEALTHEAST (OUTPATIENT)
Dept: FAMILY MEDICINE | Facility: CLINIC | Age: 60
End: 2020-09-23

## 2020-09-23 DIAGNOSIS — F41.1 GENERALIZED ANXIETY DISORDER: ICD-10-CM

## 2020-09-29 ENCOUNTER — OFFICE VISIT - HEALTHEAST (OUTPATIENT)
Dept: PHYSICAL THERAPY | Facility: REHABILITATION | Age: 60
End: 2020-09-29

## 2020-09-29 DIAGNOSIS — R29.818 DIFFICULTY BALANCING: ICD-10-CM

## 2020-09-29 DIAGNOSIS — R26.81 UNSTEADY GAIT: ICD-10-CM

## 2020-09-29 DIAGNOSIS — M25.661 DECREASED RANGE OF MOTION OF BOTH LOWER EXTREMITIES: ICD-10-CM

## 2020-09-29 DIAGNOSIS — R27.9 LACK OF COORDINATION: ICD-10-CM

## 2020-09-29 DIAGNOSIS — M25.662 DECREASED RANGE OF MOTION OF BOTH LOWER EXTREMITIES: ICD-10-CM

## 2020-10-02 ENCOUNTER — OFFICE VISIT - HEALTHEAST (OUTPATIENT)
Dept: PHYSICAL THERAPY | Facility: REHABILITATION | Age: 60
End: 2020-10-02

## 2020-10-02 DIAGNOSIS — R27.9 LACK OF COORDINATION: ICD-10-CM

## 2020-10-02 DIAGNOSIS — R26.81 UNSTEADY GAIT: ICD-10-CM

## 2020-10-02 DIAGNOSIS — M25.662 DECREASED RANGE OF MOTION OF BOTH LOWER EXTREMITIES: ICD-10-CM

## 2020-10-02 DIAGNOSIS — R29.818 DIFFICULTY BALANCING: ICD-10-CM

## 2020-10-02 DIAGNOSIS — M25.661 DECREASED RANGE OF MOTION OF BOTH LOWER EXTREMITIES: ICD-10-CM

## 2020-10-06 ENCOUNTER — OFFICE VISIT - HEALTHEAST (OUTPATIENT)
Dept: PHYSICAL THERAPY | Facility: REHABILITATION | Age: 60
End: 2020-10-06

## 2020-10-06 DIAGNOSIS — R26.81 UNSTEADY GAIT: ICD-10-CM

## 2020-10-06 DIAGNOSIS — M25.662 DECREASED RANGE OF MOTION OF BOTH LOWER EXTREMITIES: ICD-10-CM

## 2020-10-06 DIAGNOSIS — R27.9 LACK OF COORDINATION: ICD-10-CM

## 2020-10-06 DIAGNOSIS — R29.818 DIFFICULTY BALANCING: ICD-10-CM

## 2020-10-06 DIAGNOSIS — M25.661 DECREASED RANGE OF MOTION OF BOTH LOWER EXTREMITIES: ICD-10-CM

## 2020-10-07 ENCOUNTER — COMMUNICATION - HEALTHEAST (OUTPATIENT)
Dept: SCHEDULING | Facility: CLINIC | Age: 60
End: 2020-10-07

## 2020-10-07 DIAGNOSIS — F32.5 MAJOR DEPRESSIVE DISORDER, SINGLE EPISODE IN FULL REMISSION (H): ICD-10-CM

## 2020-10-07 DIAGNOSIS — F41.1 GENERALIZED ANXIETY DISORDER: ICD-10-CM

## 2020-10-08 ENCOUNTER — OFFICE VISIT - HEALTHEAST (OUTPATIENT)
Dept: PHYSICAL THERAPY | Facility: REHABILITATION | Age: 60
End: 2020-10-08

## 2020-10-08 DIAGNOSIS — R26.81 UNSTEADY GAIT: ICD-10-CM

## 2020-10-08 DIAGNOSIS — M25.662 DECREASED RANGE OF MOTION OF BOTH LOWER EXTREMITIES: ICD-10-CM

## 2020-10-08 DIAGNOSIS — R29.818 DIFFICULTY BALANCING: ICD-10-CM

## 2020-10-08 DIAGNOSIS — M25.661 DECREASED RANGE OF MOTION OF BOTH LOWER EXTREMITIES: ICD-10-CM

## 2020-10-08 DIAGNOSIS — R27.9 LACK OF COORDINATION: ICD-10-CM

## 2020-10-20 ENCOUNTER — OFFICE VISIT - HEALTHEAST (OUTPATIENT)
Dept: PHYSICAL THERAPY | Facility: REHABILITATION | Age: 60
End: 2020-10-20

## 2020-10-20 DIAGNOSIS — R27.9 LACK OF COORDINATION: ICD-10-CM

## 2020-10-20 DIAGNOSIS — M25.662 DECREASED RANGE OF MOTION OF BOTH LOWER EXTREMITIES: ICD-10-CM

## 2020-10-20 DIAGNOSIS — R29.818 DIFFICULTY BALANCING: ICD-10-CM

## 2020-10-20 DIAGNOSIS — R26.81 UNSTEADY GAIT: ICD-10-CM

## 2020-10-20 DIAGNOSIS — M25.661 DECREASED RANGE OF MOTION OF BOTH LOWER EXTREMITIES: ICD-10-CM

## 2020-10-22 ENCOUNTER — OFFICE VISIT - HEALTHEAST (OUTPATIENT)
Dept: PHYSICAL THERAPY | Facility: REHABILITATION | Age: 60
End: 2020-10-22

## 2020-10-22 DIAGNOSIS — M25.661 DECREASED RANGE OF MOTION OF BOTH LOWER EXTREMITIES: ICD-10-CM

## 2020-10-22 DIAGNOSIS — M25.662 DECREASED RANGE OF MOTION OF BOTH LOWER EXTREMITIES: ICD-10-CM

## 2020-10-22 DIAGNOSIS — R26.81 UNSTEADY GAIT: ICD-10-CM

## 2020-10-22 DIAGNOSIS — R27.9 LACK OF COORDINATION: ICD-10-CM

## 2020-10-22 DIAGNOSIS — R29.818 DIFFICULTY BALANCING: ICD-10-CM

## 2020-10-27 ENCOUNTER — OFFICE VISIT - HEALTHEAST (OUTPATIENT)
Dept: PHYSICAL THERAPY | Facility: REHABILITATION | Age: 60
End: 2020-10-27

## 2020-10-27 DIAGNOSIS — R26.81 UNSTEADY GAIT: ICD-10-CM

## 2020-10-27 DIAGNOSIS — R27.9 LACK OF COORDINATION: ICD-10-CM

## 2020-10-27 DIAGNOSIS — R29.818 DIFFICULTY BALANCING: ICD-10-CM

## 2020-10-27 DIAGNOSIS — M25.661 DECREASED RANGE OF MOTION OF BOTH LOWER EXTREMITIES: ICD-10-CM

## 2020-10-27 DIAGNOSIS — M25.662 DECREASED RANGE OF MOTION OF BOTH LOWER EXTREMITIES: ICD-10-CM

## 2020-10-29 ENCOUNTER — OFFICE VISIT - HEALTHEAST (OUTPATIENT)
Dept: PHYSICAL THERAPY | Facility: REHABILITATION | Age: 60
End: 2020-10-29

## 2020-10-29 DIAGNOSIS — R27.9 LACK OF COORDINATION: ICD-10-CM

## 2020-10-29 DIAGNOSIS — R26.81 UNSTEADY GAIT: ICD-10-CM

## 2020-10-29 DIAGNOSIS — R29.818 DIFFICULTY BALANCING: ICD-10-CM

## 2020-10-29 DIAGNOSIS — M25.661 DECREASED RANGE OF MOTION OF BOTH LOWER EXTREMITIES: ICD-10-CM

## 2020-10-29 DIAGNOSIS — M25.662 DECREASED RANGE OF MOTION OF BOTH LOWER EXTREMITIES: ICD-10-CM

## 2020-10-30 ENCOUNTER — COMMUNICATION - HEALTHEAST (OUTPATIENT)
Dept: FAMILY MEDICINE | Facility: CLINIC | Age: 60
End: 2020-10-30

## 2020-10-30 DIAGNOSIS — F32.5 MAJOR DEPRESSIVE DISORDER, SINGLE EPISODE IN FULL REMISSION (H): ICD-10-CM

## 2020-10-30 DIAGNOSIS — F41.1 GENERALIZED ANXIETY DISORDER: ICD-10-CM

## 2020-10-30 DIAGNOSIS — G47.00 INSOMNIA, UNSPECIFIED TYPE: ICD-10-CM

## 2021-04-05 ENCOUNTER — OFFICE VISIT - HEALTHEAST (OUTPATIENT)
Dept: FAMILY MEDICINE | Facility: CLINIC | Age: 61
End: 2021-04-05

## 2021-04-05 DIAGNOSIS — K51.90 ULCERATIVE COLITIS WITHOUT COMPLICATIONS, UNSPECIFIED LOCATION (H): ICD-10-CM

## 2021-04-05 DIAGNOSIS — R27.0 ATAXIA: ICD-10-CM

## 2021-04-05 DIAGNOSIS — F32.5 MAJOR DEPRESSIVE DISORDER, SINGLE EPISODE IN FULL REMISSION (H): ICD-10-CM

## 2021-04-05 ASSESSMENT — ANXIETY QUESTIONNAIRES
3. WORRYING TOO MUCH ABOUT DIFFERENT THINGS: SEVERAL DAYS
7. FEELING AFRAID AS IF SOMETHING AWFUL MIGHT HAPPEN: SEVERAL DAYS
5. BEING SO RESTLESS THAT IT IS HARD TO SIT STILL: NOT AT ALL
6. BECOMING EASILY ANNOYED OR IRRITABLE: NOT AT ALL
GAD7 TOTAL SCORE: 7
2. NOT BEING ABLE TO STOP OR CONTROL WORRYING: SEVERAL DAYS
4. TROUBLE RELAXING: MORE THAN HALF THE DAYS
1. FEELING NERVOUS, ANXIOUS, OR ON EDGE: MORE THAN HALF THE DAYS
IF YOU CHECKED OFF ANY PROBLEMS ON THIS QUESTIONNAIRE, HOW DIFFICULT HAVE THESE PROBLEMS MADE IT FOR YOU TO DO YOUR WORK, TAKE CARE OF THINGS AT HOME, OR GET ALONG WITH OTHER PEOPLE: VERY DIFFICULT

## 2021-04-05 ASSESSMENT — MIFFLIN-ST. JEOR: SCORE: 1227.68

## 2021-04-12 ENCOUNTER — COMMUNICATION - HEALTHEAST (OUTPATIENT)
Dept: NURSING | Facility: CLINIC | Age: 61
End: 2021-04-12

## 2021-04-12 ENCOUNTER — COMMUNICATION - HEALTHEAST (OUTPATIENT)
Dept: PHARMACY | Facility: CLINIC | Age: 61
End: 2021-04-12

## 2021-04-12 DIAGNOSIS — F32.5 MAJOR DEPRESSIVE DISORDER, SINGLE EPISODE IN FULL REMISSION (H): ICD-10-CM

## 2021-04-12 DIAGNOSIS — F41.1 GENERALIZED ANXIETY DISORDER: ICD-10-CM

## 2021-05-27 ENCOUNTER — RECORDS - HEALTHEAST (OUTPATIENT)
Dept: ADMINISTRATIVE | Facility: CLINIC | Age: 61
End: 2021-05-27

## 2021-05-27 ASSESSMENT — PATIENT HEALTH QUESTIONNAIRE - PHQ9
SUM OF ALL RESPONSES TO PHQ QUESTIONS 1-9: 4
SUM OF ALL RESPONSES TO PHQ QUESTIONS 1-9: 11

## 2021-05-28 ASSESSMENT — ANXIETY QUESTIONNAIRES: GAD7 TOTAL SCORE: 7

## 2021-05-29 NOTE — PROGRESS NOTES
"  Assessment/Plan:     1. Generalized anxiety disorder  2. Major Depression, Single Episode In Remission  Doing quite well and in remission on current medications.  She will continue.  I discussed with her once again that if in the future she were to choose to reduce her venlafaxine that I would be comfortable with that but I also feel that there is no indication to do so.    3. Atypical nevus of lower leg, right  Advised return for future visit for punch biopsy.  She will see a dermatologist for the skin lesion on her face.    4. Seborrheic keratosis  Reassured that this is benign in nature.    5. Ulcerative colitis without complications, unspecified location (H)  Status post colectomy.      There are no Patient Instructions on file for this visit.     Return in about 1 month (around 7/6/2019) for Biopsy of nevus.      Subjective:      Dory Abrams is a 59 y.o. female presented to clinic today, initially scheduled his physical but declining a physical today.  Instead she would like to just follow-up on her anxiety depression which she states has been in good control, she is feeling well.  Remains compliant with venlafaxine and bupropion, rare mixed doses at which time she may feel nausea.  Noting no side effects.  She very briefly attempted to wean the venlafaxine down to 1 tab daily shortly after the loss of her father, did not feel ready in doing so and then return to the full dose.  She is sleeping well for the most part, occasionally will take melatonin if she is having difficulties.  Feels that her diet is stable.  Exercising by walking but states \"I hate exercise\" she does not drink alcohol, it has been 25 years now.  Continues to smoke 5 cigarettes/day, she is not interested in assistance in quitting, is considering trial of the nicotine patch.  We discussed that she is due for mammogram, she is not ready for this due to callback from past mammogram that provoked quite a bit of anxiety for her and was " normal, and also states that her times tears with the mammogram.    She is noting a nevus on her right breast that she would like me to look at.  She is noting a nevus on her right outer leg, states her mother had one in the same location that developed into melanoma.  Sister with similar and had it removed before any changes occurred.  Patient interested in having her is removed as well.  She is also noting a small lesion on her right cheek that is present for 3 years, slightly pink, and intimately scaly.    Current Outpatient Medications   Medication Sig Dispense Refill     buPROPion (WELLBUTRIN SR) 150 MG 12 hr tablet Take 1 tablet (150 mg total) by mouth 2 (two) times a day. **OFFICE VISIT REQUIRED FOR FURTHER REFILLS** 60 tablet 0     CALCIUM-VITAMIN D3 ORAL Take by mouth Daily at 8:00 am..       glucosamine/chondr sloan A sod (OSTEO BI-FLEX ORAL) Take by mouth.       naproxen sodium (ALEVE) 220 MG tablet as needed.       venlafaxine (EFFEXOR-XR) 75 MG 24 hr capsule TAKE 2 CAPSULES BY MOUTH DAILY. 180 capsule 0     alendronate (FOSAMAX) 70 MG tablet Take 1 tablet (70 mg total) by mouth once a week. 12 tablet 2     buPROPion (WELLBUTRIN SR) 150 MG 12 hr tablet Take 1 tablet (150 mg total) by mouth 2 (two) times a day. 180 tablet 0     venlafaxine (EFFEXOR-XR) 75 MG 24 hr capsule Take 2 capsules (150 mg total) by mouth daily. 180 capsule 2     No current facility-administered medications for this visit.        Past Medical History, Family History, and Social History reviewed.  History reviewed. No pertinent past medical history.  Past Surgical History:   Procedure Laterality Date     HYSTERECTOMY       OOPHORECTOMY       OK APPENDECTOMY      Description: Appendectomy;  Recorded: 09/25/2011;     OK LIGATE FALLOPIAN TUBE      Description: Tubal Ligation;  Recorded: 09/25/2011;     OK REMOVAL COLON/ILEOSTOMY      Description: Total Abdominal Colectomy;  Recorded: 09/25/2011;  Comments: due to Ulcerative Colitis age 21.  " \"Hugo Pouch\"     [facility] REMOVAL GALLBLADDER      Description: Cholecystectomy;  Recorded: 09/25/2011;     [facility] TOTAL ABDOM HYSTERECTOMY      Description: Total Abdominal Hysterectomy;  Recorded: 11/25/2013;     Aspirin (tartrazine only)  Family History   Problem Relation Age of Onset     Diabetes Father      Diabetes Sister      Social History     Socioeconomic History     Marital status:      Spouse name: Not on file     Number of children: Not on file     Years of education: Not on file     Highest education level: Not on file   Occupational History     Not on file   Social Needs     Financial resource strain: Not on file     Food insecurity:     Worry: Not on file     Inability: Not on file     Transportation needs:     Medical: Not on file     Non-medical: Not on file   Tobacco Use     Smoking status: Current Every Day Smoker     Packs/day: 1.00     Years: 41.00     Pack years: 41.00     Smokeless tobacco: Never Used   Substance and Sexual Activity     Alcohol use: Not on file     Drug use: Not on file     Sexual activity: Not on file   Lifestyle     Physical activity:     Days per week: Not on file     Minutes per session: Not on file     Stress: Not on file   Relationships     Social connections:     Talks on phone: Not on file     Gets together: Not on file     Attends Adventism service: Not on file     Active member of club or organization: Not on file     Attends meetings of clubs or organizations: Not on file     Relationship status: Not on file     Intimate partner violence:     Fear of current or ex partner: Not on file     Emotionally abused: Not on file     Physically abused: Not on file     Forced sexual activity: Not on file   Other Topics Concern     Not on file   Social History Narrative     Not on file         Review of systems is as stated in HPI, and the remainder of the 10 system review is otherwise negative.    Objective:     Vitals:    06/06/19 1028   BP: 122/64   Pulse: 77   Resp: 20 " "  Temp: 98.4  F (36.9  C)   TempSrc: Oral   SpO2: 96%   Weight: 155 lb 11.2 oz (70.6 kg)   Height: 5' 6\" (1.676 m)    Body mass index is 25.13 kg/m .    Alert female.  Affect within normal limits.  Thought processes and judgment intact.  Normal psychomotor activities.  Benign subcentimeter seborrheic keratosis right breast.  Right leg is with well demarcated, mildly hyperpigmented flat nevus with some areas of darker hyperpigmentation.  About 1 cm in greatest diameter.  Right cheek is with a 3 mm slightly raised pink papule, smooth overlying skin.      This note has been dictated using voice recognition software. Any grammatical or context distortions are unintentional and inherent to the the software.   "

## 2021-05-30 VITALS — WEIGHT: 139 LBS | HEIGHT: 66 IN | BODY MASS INDEX: 22.34 KG/M2

## 2021-05-31 VITALS — BODY MASS INDEX: 22.69 KG/M2 | WEIGHT: 140.6 LBS

## 2021-06-02 VITALS — HEIGHT: 66 IN | BODY MASS INDEX: 24.32 KG/M2 | WEIGHT: 151.3 LBS

## 2021-06-03 VITALS — HEIGHT: 66 IN | WEIGHT: 155.7 LBS | BODY MASS INDEX: 25.02 KG/M2

## 2021-06-04 VITALS
TEMPERATURE: 98 F | BODY MASS INDEX: 22.55 KG/M2 | DIASTOLIC BLOOD PRESSURE: 72 MMHG | RESPIRATION RATE: 20 BRPM | WEIGHT: 140.3 LBS | HEIGHT: 66 IN | OXYGEN SATURATION: 97 % | SYSTOLIC BLOOD PRESSURE: 110 MMHG | HEART RATE: 85 BPM

## 2021-06-04 NOTE — TELEPHONE ENCOUNTER
Refill Approved    Rx renewed per Medication Renewal Policy. Medication was last renewed on 7/5/18.    Shayla Charles, [facility] Triage/Med Refill 12/25/2019     Requested Prescriptions   Pending Prescriptions Disp Refills     buPROPion (WELLBUTRIN SR) 150 MG 12 hr tablet 60 tablet 0     Sig: Take 1 tablet (150 mg total) by mouth 2 (two) times a day. **OFFICE VISIT REQUIRED FOR FURTHER REFILLS**       Tricyclics/Misc Antidepressant/Antianxiety Meds Refill Protocol Passed - 12/24/2019 10:29 AM        Passed - PCP or prescribing provider visit in last year     Last office visit with prescriber/PCP: 6/6/2019 Ave Mcdonough MD OR same dept: 6/6/2019 Ave Mcdonough MD OR same specialty: 6/6/2019 Ave Mcdonough MD  Last physical: 2/27/2017 Last MTM visit: Visit date not found   Next visit within 3 mo: Visit date not found  Next physical within 3 mo: Visit date not found  Prescriber OR PCP: Ave Mcdonough MD  Last diagnosis associated with med order: 1. Generalized anxiety disorder  - buPROPion (WELLBUTRIN SR) 150 MG 12 hr tablet; Take 1 tablet (150 mg total) by mouth 2 (two) times a day. **OFFICE VISIT REQUIRED FOR FURTHER REFILLS**  Dispense: 60 tablet; Refill: 0    If protocol passes may refill for 12 months if within 3 months of last provider visit (or a total of 15 months).

## 2021-06-04 NOTE — TELEPHONE ENCOUNTER
Refill Request  Did you contact pharmacy: No  Medication name:   Requested Prescriptions     Pending Prescriptions Disp Refills     buPROPion (WELLBUTRIN SR) 150 MG 12 hr tablet 60 tablet 0     Sig: Take 1 tablet (150 mg total) by mouth 2 (two) times a day. **OFFICE VISIT REQUIRED FOR FURTHER REFILLS**

## 2021-06-05 VITALS
TEMPERATURE: 98.2 F | BODY MASS INDEX: 22.71 KG/M2 | OXYGEN SATURATION: 97 % | RESPIRATION RATE: 20 BRPM | HEART RATE: 78 BPM | HEIGHT: 66 IN | DIASTOLIC BLOOD PRESSURE: 68 MMHG | SYSTOLIC BLOOD PRESSURE: 110 MMHG | WEIGHT: 141.3 LBS

## 2021-06-05 NOTE — TELEPHONE ENCOUNTER
Refill Approved    Rx renewed per Medication Renewal Policy. Medication was last renewed on 12/25/19.    Shayla Charles, [facility] Triage/Med Refill 1/23/2020     Requested Prescriptions   Pending Prescriptions Disp Refills     buPROPion (WELLBUTRIN SR) 150 MG 12 hr tablet [Pharmacy Med Name: BUPROPION SR 150MG TABLETS (12 H)] 60 tablet 0     Sig: TAKE 1 TABLET BY MOUTH TWICE DAILY. OFFICE VISIT REQUIRED FOR FURTHER REFILLS       Tricyclics/Misc Antidepressant/Antianxiety Meds Refill Protocol Passed - 1/22/2020  5:16 PM        Passed - PCP or prescribing provider visit in last year     Last office visit with prescriber/PCP: 6/6/2019 Ave Mcdonough MD OR same dept: 6/6/2019 Ave Mcdonough MD OR same specialty: 6/6/2019 Ave Mcdonough MD  Last physical: 2/27/2017 Last MTM visit: Visit date not found   Next visit within 3 mo: Visit date not found  Next physical within 3 mo: Visit date not found  Prescriber OR PCP: Ave Mcdonough MD  Last diagnosis associated with med order: 1. Generalized anxiety disorder  - buPROPion (WELLBUTRIN SR) 150 MG 12 hr tablet [Pharmacy Med Name: BUPROPION SR 150MG TABLETS (12 H)]; TAKE 1 TABLET BY MOUTH TWICE DAILY. OFFICE VISIT REQUIRED FOR FURTHER REFILLS  Dispense: 60 tablet; Refill: 0    If protocol passes may refill for 12 months if within 3 months of last provider visit (or a total of 15 months).

## 2021-06-06 NOTE — TELEPHONE ENCOUNTER
RN triage  Patient is calling to report that she has an upcoming appointment with her PCP  3/23/2020 for a medication check for refills.  Patient states she is concerned about coming into the clinic.  She states that she has a cough at this time.  Patient is hoping to have her visit switched to a telephone visit.  Please call patient to have this scheduled if possible.   Thank you.  Alisson Alfaro, RN  [facility] Triage Nurse  11:20 AM  3/18/2020      Reason for Disposition    Nursing judgment    Protocols used: NO PROTOCOL AVAILABLE - INFORMATION ONLY-A-[place]

## 2021-06-07 ENCOUNTER — COMMUNICATION - HEALTHEAST (OUTPATIENT)
Dept: FAMILY MEDICINE | Facility: CLINIC | Age: 61
End: 2021-06-07

## 2021-06-07 DIAGNOSIS — F32.5 MAJOR DEPRESSIVE DISORDER, SINGLE EPISODE IN FULL REMISSION (H): ICD-10-CM

## 2021-06-07 DIAGNOSIS — G47.00 INSOMNIA, UNSPECIFIED TYPE: ICD-10-CM

## 2021-06-07 DIAGNOSIS — F41.1 GENERALIZED ANXIETY DISORDER: ICD-10-CM

## 2021-06-07 NOTE — TELEPHONE ENCOUNTER
freddy- pt has a telephone visit with Olga Sanchez tomorrow that needs to be rescheduled with another provider over the phone as Olga will not be in tomorrow.

## 2021-06-07 NOTE — PROGRESS NOTES
"oDry Abrams is a 59 y.o. female who is being evaluated via a billable telephone visit.      The patient has been notified of following:     \"This telephone visit will be conducted via a call between you and your physician/provider. We have found that certain health care needs can be provided without the need for a physical exam.  This service lets us provide the care you need with a short phone conversation.  If a prescription is necessary we can send it directly to your pharmacy.  If lab work is needed we can place an order for that and you can then stop by our lab to have the test done at a later time.    If during the course of the call the physician/provider feels a telephone visit is not appropriate, you will not be charged for this service.\"     Patient has given verbal consent to a Telephone visit? Yes    Dory Abrams complains of    Chief Complaint   Patient presents with     Follow-up     med check - needs refills       I have reviewed and updated the patient's Past Medical History, Social History, Family History and Medication List.    ALLERGIES  Aspirin (tartrazine only)    Additional provider notes: Patient reports doing generally well over the last couple of months.  It has been somewhat stressful for her December of 2019 due to loss of her job of 24 years.  With coronavirus and current economic status, she has been unable to find work since then.  Her  is also out of work.  Despite all of this, she feels that her mood has been good.  She does not feel that depression or anxiety has worsened.  She remains on Wellbutrin 150 mg daily along with venlafaxine 150 mg daily for depression anxiety.  She feels that current dose is working well for her.  She does have some concern about sleep troubles.  States that she has had episodes of insomnia most nights over the last 3 months.  Occasionally has a difficult time falling asleep and other times staying asleep is the issue.  She will sleep 5 hours " one night and then a full 10 hours the next night.  Sleep is very inconsistent.  She has been taking over-the-counter melatonin Gummies without relief.  She is wondering if there is anything else she can take.  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    Assessment/Plan:  1. Major Depression, Single Episode In Remission  2. Generalized anxiety disorder  PHQ 9 score of 4 today. Refills provided for venlafaxine and Wellbutrin at current doses.  Patient denies any side effects from the medication.  She will follow-up with any worsening depression or anxiety.  - venlafaxine (EFFEXOR-XR) 75 MG 24 hr capsule; Take 2 capsules (150 mg total) by mouth daily.  Dispense: 180 capsule; Refill: 1  - buPROPion (WELLBUTRIN SR) 150 MG 12 hr tablet; TAKE 1 TABLET BY MOUTH TWICE DAILY. OFFICE VISIT REQUIRED FOR FURTHER REFILLS  Dispense: 180 tablet; Refill: 1    3. Insomnia, unspecified type  Discussed options to treat insomnia.  Melatonin has not worked for her.  Prefer not to use benzodiazepines for sleep assistance.  Will start with trazodone 50 mg to 100 mg at bedtime.  Recommend follow-up with phone visit in approximately 4 weeks to reassess.  Discussed sleep hygiene and healthy sleep patterns.  - traZODone (DESYREL) 50 MG tablet; Take between 1-2 tablets ( mg) by mouth at bedtime.  Dispense: 60 tablet; Refill: 1        Phone call duration: 15 minutes    Olga Sanchez, CNP

## 2021-06-09 NOTE — TELEPHONE ENCOUNTER
"While walking, then legs go weak, tingling, arms don't do what they \"are told what they are told\", and have to \"concentrate to swallow\". Has daily issues with swallowing, fluids and solids.  Current smoker.    Her mom had MS.    Her mom had there symptoms.    Has been isolated a lot.    Thinks she needs to get her eyes checked but wears glasses.    Urgent urinary symptoms also.    No HA, blurred or double vision.    She says she has had this for a while and it effects her daily.  It scares her.    Transferred to scheduling for an appointment.    Appt made for 2:50pm with pcp    She is requesting her  come with to this appt.  Can he come with to appt?  Please call and update patient??        Pamella Shields RN [facility] Triage Nurse Advisor        Reason for Disposition    [1] Swallowing difficulty AND [2] cause unknown (Exception: difficulty swallowing is a chronic symptom)    Additional Information    Negative: [1] Severe difficulty swallowing (e.g., drooling or spitting) AND [2] started suddenly after taking a medicine or allergic food    Negative: Wheezing, stridor, hoarseness, or difficulty breathing    Negative: [1] Swollen tongue AND [2] sudden onset    Negative: Sounds like a life-threatening emergency to the triager    Negative: Mouth ulcers are seen    Negative: Sore throat (throat pain with swallowing)    Negative: Swallowed a (non-edible) foreign body    Negative: Feeding tube, questions or concerns related to    Negative: Swelling of tongue    Negative: SEVERE difficulty swallowing (e.g., drooling or spitting, can't swallow water)    Negative: [1] Symptoms of blocked esophagus (e.g., can't swallow normal secretions, drooling) AND [2] present now    Negative: Symptoms of food or bone stuck in throat or esophagus (e.g., pain in throat or chest, FB sensation, blood-tinged saliva)    Negative: SEVERE symptoms of pill stuck in throat or esophagus (e.g., severe pain, bleeding, or inability to swallow " liquids)    Negative: [1] Drinking very little AND [2] dehydration suspected (e.g., no urine > 12 hours, very dry mouth, very lightheaded)    Negative: [1] Refuses to drink anything AND [2] for > 12 hours    Negative: Patient sounds very sick or weak to the triager    Negative: Fever > 100.5 F (38.1 C)    Negative: [1] Coughing spells AND [2] occur during eating/feedings or within 2 hours    Negative: [1] Symptoms of pill stuck in throat or esophagus (e.g., pain in throat or chest, FB sensation) AND [2] no relief after using CARE ADVICE    Negative: Weak immune system (e.g., HIV positive, cancer chemo, splenectomy, organ transplant, chronic steroids)    Protocols used: SWALLOWING DIFFICULTY-A-AH

## 2021-06-09 NOTE — PROGRESS NOTES
Assessment/Plan:     1. Routine general medical examination at a health care facility  Encouraged healthy lifestyle habits including regular exercise, healthy eating habits, and adequate calcium and vitamin D intake.  She is not in need of Pap smears or colonoscopy.  We review her mammograms every 1-2 years.  Will obtain fasting lipids and fasting glucose.  Immunizations up-to-date.  She declines flu shot today.  - Lipid Cascade FASTING  - Glucose; Future    2. Generalized anxiety disorder  3. Major Depression, Single Episode In Remission  Doing well, in remission.  She will continue bupropion at current dose.  She is considering reduction in venlafaxine dose, will let me know if she decides to do this.    4. Osteoporosis  She will continue her knee daily.  Will check vitamin D level.  We will schedule follow-up bone density scan.  - DXA Bone Density Scan; Future    5. Vitamin D deficiency  Encouraged continued daily supplement.  Will check vitamin D level today.  - Vitamin D, Total (25-Hydroxy)    6. Ulcerative colitis  Status post colectomy.    I have had an Advance Directives discussion with the patient.  I have counseled the patient for tobacco cessation and the follow up will occur  at the next visit.        Subjective:     Dory Abrams is a 56 y.o. female who presents for an annual exam.  She is doing quite well in the last year without changes in her health.  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  Feels that she has been able to manage this stress.  In fact, she is considering whether she would like to reduce the venlafaxine this spring.  She is interested in continuing bupropion.  Continues to smoke 3-5 cigarettes daily, considering quitting, does not feel ready and is not  interested in further assistance.  History of ulcerative colitis status post colectomy, doing well from that standpoint.  She has a history of osteoporosis for which he takes alendronate, last bone density scan was 2 years ago and so she is due for follow-up.  She is a history of pulmonary nodule that had resolved and the other smaller ones recessed on CT scan July 2015 no longer requiring follow-up.    She is .  One daughter and granddaughter who live in [place].  Her  is now home and is no longer in [place].  She smokes 3-5 cigarettes daily.  She does not drink alcohol.  Drinks quite a bit of coffee.  No regular exercise, and try it for 3 months and did not perceive any benefit and did not like it, considering doing regular walking would like to hike when she moved to [place] some day.    Healthy Habits:   Healthy Diet: Yes  Regular Exercise: No  Sunscreen Use: Yes  Dental Visits Regularly: N/A and full dentures  Seat Belt: Yes  Domestic abuse:   No    Health Maintenance reviewed:  Lipid Profile: due today  Glucose Screen: due today  Colonoscopy: NA  Mammogram: 3/2/17    Gynecologic History  No LMP recorded. Patient has had a hysterectomy.  Contraception: status post hysterectomy  Last Pap: NA. Results were: noncancerous        Immunization History   Administered Date(s) Administered     Pneumo Polysac 23-V 09/12/2011, 02/19/2015     Td, historic 1961     Tdap 09/12/2011     Immunization status: up to date and documented, declines flu shot due to feeling very poorly each time she has received in the past.    Current Outpatient Prescriptions   Medication Sig Dispense Refill     buPROPion (WELLBUTRIN SR) 150 MG 12 hr tablet Take 1 tablet (150 mg total) by mouth 2 (two) times a day. 180 tablet 0     buPROPion (WELLBUTRIN SR) 150 MG 12 hr tablet Take 1 tablet (150 mg total) by mouth 2 (two) times a day. **OFFICE VISIT REQUIRED FOR FURTHER REFILLS** 60 tablet 0     naproxen sodium (ALEVE) 220 MG  "tablet as needed.       venlafaxine (EFFEXOR-XR) 75 MG 24 hr capsule Take 2 capsules (150 mg total) by mouth daily. 180 capsule 2     venlafaxine (EFFEXOR-XR) 75 MG 24 hr capsule TAKE 2 CAPSULES BY MOUTH DAILY 180 capsule 0     alendronate (FOSAMAX) 70 MG tablet Take 1 tablet (70 mg total) by mouth once a week. 12 tablet 2     No current facility-administered medications for this visit.      No past medical history on file.  Past Surgical History:   Procedure Laterality Date     HYSTERECTOMY       OOPHORECTOMY       FL APPENDECTOMY      Description: Appendectomy;  Recorded: 09/25/2011;     FL LIGATE FALLOPIAN TUBE      Description: Tubal Ligation;  Recorded: 09/25/2011;     FL REMOVAL COLON/ILEOSTOMY      Description: Total Abdominal Colectomy;  Recorded: 09/25/2011;  Comments: due to Ulcerative Colitis age 21.  \"Hugo Pouch\"     FL REMOVAL GALLBLADDER      Description: Cholecystectomy;  Recorded: 09/25/2011;     FL TOTAL ABDOM HYSTERECTOMY      Description: Total Abdominal Hysterectomy;  Recorded: 11/25/2013;     Aspirin (tartrazine only)  Family History   Problem Relation Age of Onset     Diabetes Father      Diabetes Sister      Social History     Social History     Marital status:      Spouse name: N/A     Number of children: N/A     Years of education: N/A     Occupational History     Not on file.     Social History Main Topics     Smoking status: Current Every Day Smoker     Packs/day: 1.00     Years: 41.00     Smokeless tobacco: Not on file     Alcohol use Not on file     Drug use: Not on file     Sexual activity: Not on file     Other Topics Concern     Not on file     Social History Narrative       Review of Systems  General:  Denies problem  Eyes: Denies problem  Ears/Nose/Throat: Denies problem  Cardiovascular: Denies problem  Respiratory:  Denies problem  Gastrointestinal:  Denies problem   Genitourinary: Denies problem  Musculoskeletal:  Denies problem  Skin: Denies problem  Neurologic: Denies " "problem  Psychiatric: Denies problem  Endocrine: Denies problem  Heme/Lymphatic: Denies problem   Allergic/Immunologic: Denies problem            Objective:        Vitals:    02/27/17 1029   BP: 130/84   Pulse: 87   Resp: 20   Temp: 98.1  F (36.7  C)   TempSrc: Oral   SpO2: 97%   Weight: 139 lb (63 kg)   Height: 5' 6\" (1.676 m)     Body mass index is 22.44 kg/(m^2).    Physical Exam:  General Appearance: Alert, pleasant, appears stated age  Head: Normocephalic, without obvious abnormality  Eyes: PERRL, conjunctiva/corneas clear, EOM's intact  Ears: Normal TM's and external ear canals, both ears  Nose: Nares normal, septum midline,mucosa normal, no drainage  Throat: Lips, mucosa, and tongue normal; teeth and gums normal; oropharynx is clear  Neck: Supple,without lymphadenopathy or thyromegally  Lungs: Clear to auscultation bilaterally, respirations unlabored  Heart: Regular rate and rhythm, no murmur   Breast:  Normal appearance.  No palpable masses, nipple discharge, or skin changes  Abdomen: Soft, non-tender, no masses, no organomegaly  Extremities: Extremities with strong and symmetric pulses, no cyanosis or edema  Skin: Skin color, texture normal, no rashes or lesions  Neurologic: Normal   Pelvic exam: Not indicated               This note has been dictated using voice recognition software. Any grammatical or context distortions are unintentional and inherent to the the software.    "

## 2021-06-09 NOTE — TELEPHONE ENCOUNTER
Called and notified patient and noted this in the appointment notes.  Travel screen was completed for both patient and her  and this was negative.

## 2021-06-09 NOTE — TELEPHONE ENCOUNTER
Patient Returning Call  Reason for call:  Patient called back.  Information relayed to patient:  Informed of message listed below from Dr Mcdonough.  States understanding and agrees with plan.  Patient has additional questions:  No  If YES, what are your questions/concerns:    Okay to leave a detailed message?: No call back needed

## 2021-06-09 NOTE — TELEPHONE ENCOUNTER
----- Message from Ave Mcdonough MD sent at 7/22/2020  1:10 PM CDT -----  Please call patient: Your urine culture indicates a bladder infection.  I would like you to begin the antibiotic nitrofurantoin twice daily for 5 days to treat this.

## 2021-06-10 NOTE — PROCEDURES
[facility] Procedure    Procedure: fluoro guided lumbar puncture    Radiologist: Yassine Stoner    Fluoro Time: .2 minutes  Number of images: one    EBL: minimal  Complications: none    Preliminary findings: (see dictation for full detail)  16 ml clear CSF obtained from L4-5 level.    Assess/Plan:   Samples to lab with orders as per requesting MD  Bedrest for 2 hours then discharge if stable    Yassine Stoner M.D.

## 2021-06-10 NOTE — PROGRESS NOTES
Assessment/Plan:     1. Urinary tract infection without hematuria, site unspecified  Reviewed measures to prevent recurrence of urinary tract infections including adequate hydration, regular emptying of bladder, and emptying bladder after intercourse.  Unclear how urinary incontinence taken related to urinary tract infection versus neurologic condition.  - nitrofurantoin, macrocrystal-monohydrate, (MACROBID) 100 MG capsule; Take 1 capsule (100 mg total) by mouth 2 (two) times a day for 5 days.  Dispense: 10 capsule; Refill: 0    2. Urinary incontinence, unspecified type  Unclear if this is related to urinary tract infection or neurologic condition.  Will await culture.  - Urinalysis-UC if Indicated  - Culture, Urine    3. Proximal muscle weakness  Discussed broad differential diagnosis.  We will check for generalized inflammation with sed rate.  Will check thyroid cascade to assess for thyroid dysfunction.  Will check for electrolyte abnormalities with magnesium and comprehensive metabolic panel.  Also check renal liver abnormalities.  Check for anemia contributing.  Will check to ensure vitamin deficiencies like B12 and urine not contributing.  Will screen for secondary syphilis and also for Lyme disease with blood test.  In addition, will obtain neuroimaging with MRI to further characterize.  Referral also made to neurology for further evaluation and assistance in diagnosis and management.  - Vitamin D, Total (25-Hydroxy)  - Vitamin B12  - Thyroid Cascade  - Syphilis Screen, Cascade  - Sedimentation Rate  - Magnesium  - Comprehensive Metabolic Panel  - HM2(CBC w/o Differential)  - Lyme Antibody Cascade  - MR Brain With Without Contrast; Future  - Ambulatory referral to Neurology    4. Gait disturbance  As above  - Vitamin D, Total (25-Hydroxy)  - Vitamin B12  - Thyroid Cascade  - Syphilis Screen, Cascade  - Sedimentation Rate  - Magnesium  - Comprehensive Metabolic Panel  - HM2(CBC w/o Differential)  - Lyme  Antibody Cascade  - MR Brain With Without Contrast; Future  - Ambulatory referral to Neurology    5. Vitamin D deficiency  Lab results returned indicating vitamin D deficiency.  Prescription provided for high-dose vitamin D.  Unlikely that this is contributing significantly to current syndrome.  - cholecalciferol, vitamin D3, 1,250 mcg (50,000 unit) capsule; Take 50,000 Units by mouth once a week for 6 doses.  Dispense: 6 capsule; Refill: 0    6. Visit for screening mammogram  Order placed for screening mammogram.  - Mammo Screening Bilateral; Future      There are no Patient Instructions on file for this visit.     No follow-ups on file.      Subjective:      Dory Abrams is a 60 y.o. female presented to clinic today with a couple concerns.  Noting some urinary urgency and incontinence, wondering if she could have a bladder infection.  Denies pain or burning.  Significant urgency occurs with the for sensation of needing to go.  Denies urinary retention.  Has had some new urinary incontinence at times, somewhat unpredictable.    Notes that in January she had onset of concerning symptoms that she attributed to stress is interested in having evaluated further.  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  Noting onset of swallowing difficulties though she is having to focus on it at least once daily, sometimes feeling that she cannot swallow.  Having significant reduction in her's strength at times, noted this when cocking a gun.  Noted tingling in her arms left more so than right.  Intermittently also like she loses function on the left side of her body.  Sometimes noting numbness and tingling in her legs and arms.  Extremities feel weak, states is almost as though she is intoxicated, especially on stairs which she sometimes will lose her balance.  She is falling at times.  Feeling like she is pulling towards the left when she is walking, drifting to the left.  Feels like her left leg  might be giving out.  She has been more forgetful.  At baseline her memory is quite good but now she is needing to write things down and will get them.  Intermittently will feel a bit dizzy, especially with leaning forward.  Denies lightheadedness or overt vertigo.  History of needing bifocals for her vision but now is needing to take glasses off to read which is a change.  Occasional notice a flicker or twinge with a sudden change in gait.  Slightly more frequent headaches than usual.  Occasionally feel a bit fatigued with chewing first thing in the morning, better with continued tuning.  Feels her depression and anxiety for the most part is going up and combination of venlafaxine and bupropion.  Using trazodone at bedtime.  Trazodone has been helpful for sleep.  Patient is concerned about multiple sclerosis to her mother was diagnosed with this at age 73.    Noting intermittent pain with intercourse.  We did not delve into this further today given complexity of her other concerns.    Current Outpatient Medications   Medication Sig Dispense Refill     buPROPion (WELLBUTRIN SR) 150 MG 12 hr tablet TAKE 1 TABLET BY MOUTH TWICE DAILY. OFFICE VISIT REQUIRED FOR FURTHER REFILLS 180 tablet 1     CALCIUM-VITAMIN D3 ORAL Take by mouth Daily at 8:00 am..       cholecalciferol, vitamin D3, 1,250 mcg (50,000 unit) capsule Take 50,000 Units by mouth once a week for 6 doses. 6 capsule 0     glucosamine/chondr sloan A sod (OSTEO BI-FLEX ORAL) Take by mouth.       naproxen sodium (ALEVE) 220 MG tablet as needed.       traZODone (DESYREL) 50 MG tablet Take between 1-2 tablets ( mg) by mouth at bedtime. 60 tablet 1     venlafaxine (EFFEXOR-XR) 75 MG 24 hr capsule Take 2 capsules (150 mg total) by mouth daily. 180 capsule 1     No current facility-administered medications for this visit.        Past Medical History, Family History, and Social History reviewed.  No past medical history on file.  Past Surgical History:   Procedure  "Laterality Date     HYSTERECTOMY       LUMBAR PUNCTURE FLUORO GUIDED DIAGNOSTIC  8/10/2020     OOPHORECTOMY       MT APPENDECTOMY      Description: Appendectomy;  Recorded: 09/25/2011;     MT LIGATE FALLOPIAN TUBE      Description: Tubal Ligation;  Recorded: 09/25/2011;     MT REMOVAL COLON/ILEOSTOMY      Description: Total Abdominal Colectomy;  Recorded: 09/25/2011;  Comments: due to Ulcerative Colitis age 21.  \"Hugo Pouch\"     MT REMOVAL GALLBLADDER      Description: Cholecystectomy;  Recorded: 09/25/2011;     MT TOTAL ABDOM HYSTERECTOMY      Description: Total Abdominal Hysterectomy;  Recorded: 11/25/2013;     Aspirin (tartrazine only)  Family History   Problem Relation Age of Onset     Diabetes Father      Diabetes Sister      Social History     Socioeconomic History     Marital status:      Spouse name: Not on file     Number of children: Not on file     Years of education: Not on file     Highest education level: Not on file   Occupational History     Not on file   Social Needs     Financial resource strain: Not on file     Food insecurity     Worry: Not on file     Inability: Not on file     Transportation needs     Medical: Not on file     Non-medical: Not on file   Tobacco Use     Smoking status: Current Every Day Smoker     Packs/day: 1.00     Years: 41.00     Pack years: 41.00     Smokeless tobacco: Never Used   Substance and Sexual Activity     Alcohol use: Not on file     Drug use: Not on file     Sexual activity: Not on file   Lifestyle     Physical activity     Days per week: Not on file     Minutes per session: Not on file     Stress: Not on file   Relationships     Social connections     Talks on phone: Not on file     Gets together: Not on file     Attends Sikh service: Not on file     Active member of club or organization: Not on file     Attends meetings of clubs or organizations: Not on file     Relationship status: Not on file     Intimate partner violence     Fear of current or ex " "partner: Not on file     Emotionally abused: Not on file     Physically abused: Not on file     Forced sexual activity: Not on file   Other Topics Concern     Not on file   Social History Narrative     Not on file         Review of systems is as stated in HPI, and the remainder of the 10 system review is otherwise negative.    Objective:     Vitals:    07/20/20 1517   BP: 110/72   Pulse: 85   Resp: 20   Temp: 98  F (36.7  C)   TempSrc: Tympanic   SpO2: 97%   Weight: 140 lb 4.8 oz (63.6 kg)   Height: 5' 6\" (1.676 m)    Body mass index is 22.65 kg/m .    Alert female.  Pupils are equal, round, and reactive to light.  Extraocular movements intact.  Today    Translucent.  Oropharynx is clear.  Face moves symmetrically.  Neck supple without lymphadenopathy or thyromegaly.  Swallows without difficulty on exam.  Heart with regular rate and rhythm.  Lungs clear.  4+5 strength left arm, most notably with grass, index to thumb opposition.  Left lower extremity with 4+ out of 5 strength with left hip flexion and left foot dorsiflexion.  5 out of 5 strength throughout the remainder.  Deep tendon reflexes symmetric.  No clonus.  Romberg negative.  Unable to perform tandem gait.  Gait analyzing clinic today is a bit slower than usual but without overt abnormality.  Heart with regular rate and rhythm.  Lungs clear.  No CVA tenderness.  Abdomen soft and nontender.      This note has been dictated using voice recognition software. Any grammatical or context distortions are unintentional and inherent to the the software.   "

## 2021-06-10 NOTE — TELEPHONE ENCOUNTER
Gave patient info for scheduling from 7/20/20 visit with pcp.  She declines the mammo number as she has had bad experience in the past.  She agrees to the neurology and will call them to schedule.    Pamella Shields RN [facility] Triage Nurse Advisor

## 2021-06-11 NOTE — PROGRESS NOTES
[facility] Daily Progress     Patient Name: Dory Abrams  Date: 10/2/2020  Visit #: 2/12  Referral Diagnosis: Ataxia  Referring provider: Ave Mcdonough MD  Visit Diagnosis:     ICD-10-CM    1. Unsteady gait  R26.81    2. Difficulty balancing  R29.818    3. Decreased range of motion of both lower extremities  M25.661     M25.662    4. Lack of coordination  R27.9        Assessment:     Today patient reporting no change, enjoying HEP and was able to demonstrate strength in standing balance too, as well as instability. Patient wants to work on stairs and more gait training in next sessions.       HEP/POC compliance is  good .  Patient demonstrates understanding/independence with home program.  Patient is appropriate to continue with skilled physical therapy intervention, as indicated by initial plan of care.    Goal Status:  Pt. will demonstrate/verbalize independence in self-management of condition in : 12 weeks  Pt. will be independent with home exercise program in : 12 weeks  Pt. will show improved balance for safer : ambulation;for chores;in 12 weeks  Pt. will be able to walk : 1 block;for community mobility;with less difficulty;in 12 weeks;Comment  Comment:: determine if assistive device indicated  Patient will ascend / descend: stairs;with less difficulty;in 12 weeks;Comment  Comment:: using railing and no falls  Patient will transfer: sit/stand;for in/out of chair;in 12 weeks;Comment  Comment: increased strength demonstrated by 30 second sit/stand test of 6x or greater (currently at 5x)    Patient will increase : Tinnetti score;in 12 weeks;by _ points  by ___ points: 3        Plan / Patient Education:     Continue with initial plan of care.  Progress with home program as tolerated.    Plan for next visit: balance training in static and dynamic standing positions with variation of head position as this seems to trigger unsteadiness.  Further gait assessment and training in varying  positions as patient is very active at home and currently packing to move to [place], [place] in the next few weeks.   helpful and assists her with gait steadiness most of the time.    -stair and step training for weight shifting, more gait training on unsteady surfaces or with head turns    Subjective:     Patient reports she was feeling all over body fatigue after last visit, she typically gets tired middle of afternoon. She feels like she has always been uncoordinated. Patient had gone to  intially due to feeling unsteady, tremors, tripping and falling. Patient feeling unsteady all throughout the day, she compensates naturally.     Living Situation:single family home, lives with others , stairs  with railing, has assistance Yes and  took month off of work to help patient and with moving process        Occupation:not working-last time worked was 12/22/2019        Work Status:NA        Equipment Available: SE cane and walker has these at home and has used only a few times    Objective:     Gait Observation:  Patient walks slowly and deliberately with short stride gait pattern.  Patient posture is somewhat rigid during gait without normal arm swing. Patient can turn directions at slow speed, but can experience some dizziness upon doing so.   Tandem gait with support only and very slow.    Treatment Today         Exercises:  Exercise #1: standing hip flexion bilateral 10x 2-3x/day  Comment #1: standing hip abduction bilateral  10x 2-3x/day  Exercise #2: standing hip extension bilateral 10x 2-3x/day  Comment #2: standing heel raises bilteral 10x -2-3x/day  Exercise #3: sidestepping walk at kitch counter down and back 4x  2-3x/day  Comment #3: add in clinic:  balance training on foam in parallel bars, gait with head in various positions up/down (right and left) will need CGA; single leg stance; step training        TREATMENT MINUTES COMMENTS   Evaluation     Self-care/ Home management     Manual therapy      Neuromuscular Re-education 15 AIREX balance   Therapeutic Activity     Therapeutic Exercises 15 See above flowsheet  [id number]    Nustep 4 minutes    Gait training     Modality__________________                Total 30    Blank areas are intentional and mean the treatment did not include these items.       Janice Bonilla, PT  10/2/2020

## 2021-06-11 NOTE — TELEPHONE ENCOUNTER
Refill Approved    Rx renewed per Medication Renewal Policy. Medication was last renewed on 3/31/20.    Shayla Charles, [facility] Triage/Med Refill 9/25/2020     Requested Prescriptions   Pending Prescriptions Disp Refills     buPROPion (WELLBUTRIN SR) 150 MG 12 hr tablet 180 tablet 1     Sig: TAKE 1 TABLET BY MOUTH TWICE DAILY. OFFICE VISIT REQUIRED FOR FURTHER REFILLS       Tricyclics/Misc Antidepressant/Antianxiety Meds Refill Protocol Passed - 9/23/2020  6:52 PM        Passed - PCP or prescribing provider visit in last year     Last office visit with prescriber/PCP: 7/20/2020 Ave Mcdonough MD OR same dept: 7/20/2020 Ave Mcdonough MD OR same specialty: 7/20/2020 Ave Mcdonough MD  Last physical: 2/27/2017 Last MTM visit: Visit date not found   Next visit within 3 mo: Visit date not found  Next physical within 3 mo: Visit date not found  Prescriber OR PCP: Ave Mcdonough MD  Last diagnosis associated with med order: 1. Generalized anxiety disorder  - buPROPion (WELLBUTRIN SR) 150 MG 12 hr tablet; TAKE 1 TABLET BY MOUTH TWICE DAILY. OFFICE VISIT REQUIRED FOR FURTHER REFILLS  Dispense: 180 tablet; Refill: 1    If protocol passes may refill for 12 months if within 3 months of last provider visit (or a total of 15 months).

## 2021-06-11 NOTE — TELEPHONE ENCOUNTER
Refill Approved    Rx renewed per Medication Renewal Policy. Medication was last renewed on 6/28/20.    Shayla Charles, [facility] Triage/Med Refill 9/10/2020     Requested Prescriptions   Pending Prescriptions Disp Refills     traZODone (DESYREL) 50 MG tablet 60 tablet 1     Sig: Take between 1-2 tablets ( mg) by mouth at bedtime.       Tricyclics/Misc Antidepressant/Antianxiety Meds Refill Protocol Passed - 9/10/2020  2:01 PM        Passed - PCP or prescribing provider visit in last year     Last office visit with prescriber/PCP: 7/20/2020 Ave Mcdonough MD OR same dept: 7/20/2020 Ave Mcdonough MD OR same specialty: 7/20/2020 Ave Mcdonough MD  Last physical: 2/27/2017 Last MTM visit: Visit date not found   Next visit within 3 mo: Visit date not found  Next physical within 3 mo: Visit date not found  Prescriber OR PCP: Ave Mcdonough MD  Last diagnosis associated with med order: 1. Insomnia, unspecified type  - traZODone (DESYREL) 50 MG tablet; Take between 1-2 tablets ( mg) by mouth at bedtime.  Dispense: 60 tablet; Refill: 1    If protocol passes may refill for 12 months if within 3 months of last provider visit (or a total of 15 months).

## 2021-06-11 NOTE — PROGRESS NOTES
[facility]   Balance Initial Evaluation    Patient Name: Dory Abrams  Date of evaluation: 9/29/2020  Referral Diagnosis: Ataxia  Referring provider: Johanna Padron MD  Visit Diagnosis:     ICD-10-CM    1. Unsteady gait  R26.81    2. Difficulty balancing  R29.818    3. Decreased range of motion of both lower extremities  M25.661     M25.662        Assessment:      Skilled PT is required to determine course of PT  Pt. is appropriate for skilled PT intervention as outlined in the Plan of Care (POC).  Pt. is a good candidate for skilled PT services to improve pain levels and function.    Goals:  Pt. will demonstrate/verbalize independence in self-management of condition in : 12 weeks  Pt. will be independent with home exercise program in : 12 weeks  Pt. will show improved balance for safer : ambulation;for chores;in 12 weeks  Pt. will be able to walk : 1 block;for community mobility;with less difficulty;in 12 weeks;Comment  Comment:: determine if assistive device indicated  Patient will ascend / descend: stairs;with less difficulty;in 12 weeks;Comment  Comment:: using railing and no falls  Patient will transfer: sit/stand;for in/out of chair;in 12 weeks;Comment  Comment: increased strength demonstrated by 30 second sit/stand test of 6x or greater (currently at 5x)    Patient will increase : Tinnetti score;in 12 weeks;by _ points  by ___ points: 3      Patient's expectations/goals are realistic.    Barriers to Learning or Achieving Goals:  Chronicity of the problem.  Co-morbidities or other medical factors.  problem list    Patient Active Problem List   Diagnosis     Ulcerative Colitis     Major Depression, Single Episode In Remission     Generalized Anxiety Disorder     Vitamin D Deficiency     Osteoporosis     Pulmonary nodule     Viral URI     Visit for screening mammogram          From neuro report:    Dory Abrams is a 60 year old female seen for abnormal brain MRI.      Abnormal brain MRI. Most  suggestive of multiple sclerosis. Has a family history of multiple sclerosis in her mom.    MRI thoracic and cervical spine unremarkable.  CSF study unremarkable and negative for myelin basic protein and oligoclonal bands.  ESR CRP, vitamin B12, diamine, TSH, liver function, BMP and CBC unremarkable.    Over the past 1 year since 2019, she has been experiencing gait difficulty and poor balance. She has been falling total of 5-6 times from February to August 2020 mainly due to imbalance. Only 1 time she felt dizzy and lightheaded feeling like she is going to pass out otherwise no loss of consciousness. Other times there is no warning, she falls mainly due to lack of balance. Sometimes her arms and legs move spontaneously without her control. She has poor coordination and some tremors in hands. She feels the symptoms every day. No focal weakness or numbness. No vision changes. No loss of vision. No focal weakness or sensory complaints. Does not vaguely feel lightheaded or dizzy when standing up.     Headaches noted since February 2020: She has some ice headache across the nasal bridge between her eyes. It comes and goes no time pattern or position changes. No trigger. No nausea vomiting, no sensitivity to light or sound. Sometimes lasting up to hours.     Memory difficulty since 2019. Her brain not working as good as in the past. She forgets things and loses train of thoughts. No issue to manage her daily routine yet. She is laid off since January when her company closed down. She was a manager.     History of major depression under control on venlafaxine and Wellbutrin. No suicidal ideation. She also has anxiety she feels controlled.     He smokes 5 cigarettes a day. No alcohol abuse.     Her mom has a history of multiple sclerosis.     She is eating healthy and maintained pretty active taking care of herself. Sleeping is okay.    Plan / Patient Instructions:        Plan of Care:   Communication with: Referral  Source  Patient Related Instruction: Nature of Condition;Treatment plan and rationale;Self Care instruction;Basis of treatment;Posture;Precautions;Next steps;Expected outcome  Times per Week: 1-2  Number of Weeks: 8  Number of Visits: 8-12  Therapeutic Exercise: Strengthening;ROM;Stretching  Neuromuscular Reeducation: posture;balance/proprioception;core  Gait Training: gait training for improved gait pattern and speed; assess need for assistive device with gait      Plan for next visit: balance training in static and dynamic standing positions with variation of head position as this seems to trigger unsteadiness.  Further gait assessment and training in varying positions as patient is very active at home and currently packing to move to [place], [place] in the next few weeks.   helpful and assists her with gait steadiness most of the time.      Order requested for OT evaluation for fine motor assessment, cognitive assessment for ADLs.  Subjective:         Social information:   Living Situation:single family home, lives with others , stairs  with railing, has assistance Yes and  took month off of work to help patient and with moving process   Occupation:not working-last time worked was 12/22/2019   Work Status:NA   Equipment Available: SE cane and walker has these at home and has used only a few times    History of Present Illness:    Dory is a 60 y.o. female who presents to therapy today with complaints of progression of unsteadiness, un-coordination, history of falls, cognitive difficulties, and at times inability to perform fine motor skills . She states she was always uncoordinated even as a child, but gradually worsened over time and significant worsening began approximately 07/2019.      Patient denies pain except for occasional HA when having a lot of unsteadiness.    Functional limitations are described as occurring with:   ascending and descending stairs or curbs  balance  personal cares  dressing lower body and donning shoes and socks  standing for household chores  transitional movements sit to stand  walking for household and community mobility  packing/lifting preparing for moving    Patient reports no benefit from  movement or exercise        Objective:      Note: Items left blank indicates the item was not performed or not indicated at the time of the evaluation.    Patient Outcome Measures :    Lower Extremity Functional Scale (_/80): 53     Scores range from 0-80, where a score of 80 represents maximum function. The minimal clinically important difference is a positive change of 9 points.  Tinnetti Total Score (calculated): 18    Balance scores range from 0-28, where a score of less than 19 ='s a high risk for falls; 19-24 ='s a moderate risk for falls; 24-28 ='s a low risk for falls.    Balance Examination  1. Unsteady gait     2. Difficulty balancing     3. Decreased range of motion of both lower extremities       Involved side: whole body  Posture Observation:      General sitting posture is  fair.  General standing posture is fair.  Assistive Device:  has can and walker and used a couple of times     Gait Observation:  Patient walks slowly and deliberately with short stride gait pattern.  Patient posture is somewhat rigid during gait without normal arm swing. Patient can turn directions at slow speed, but can experience some dizziness upon doing so.   Tandem gait with support only and very slow.    LE Strength: single leg stance on right unable without hand support  Single leg stance with Left leg x 2 seconds without support then loses balance  Heel raise 3/5 right and left  Hip abduction -3/5 Bilateral  Hip flexion 4/5 Bilateral  Hip extension 4/5 Bilateral  Sit to stand with 2 attempts each time  Sit to stand x 30 seconds  5x total-no arm rest needed but multiple attempts to stand    Fine motor pinch grasp:  Right 7 and 7.5 kg  Left 5 and 5.5 kg    Can toe tap bilaterally at same time  today ( reports this is not always the case)  Can finger tap between 1st and 2nd digits at same time bilaterally today  (per  this varies day to day)    Balance Assessment:    Rhomberg - Eyes Closed:  10 seconds  APTA sit < > stand:  5 in 30 seconds  Tinnetti:  Tinnetti Total Score (calculated): 18 / 28  Single Leg Stance:  1 seconds for right and 5 seconds for left  Tandem walk with hand on plinth for support  Forward walk with head tilt right-slower gait and hand hold  Forward walk with head tilt left-slower gait and hand hold  Both cause some dizziness    Forward walk with head tilt down and right-slower gait and hand hold  Forward walk with head tilt down and left-slower gait and hand hold   Both cause some dizziness    Exercises:  Exercise #1: standing hip flexion bilateral 10x 2-3x/day  Comment #1: standing hip abduction bilateral  10x 2-3x/day  Exercise #2: standing hip extension bilateral 10x 2-3x/day  Comment #2: standing heel raises bilteral 10x -2-3x/day  Exercise #3: sidestepping walk at kitch counter down and back 4x  2-3x/day  Comment #3: add in clinic:  balance training on foam in parallel bars, gait with head in various positions up/down (right and left) will need CGA; single leg stance; step training        Treatment Today     TREATMENT MINUTES COMMENTS   Evaluation 30 Balance and gait/strength eval   Self-care/ Home management     Manual therapy     Neuromuscular Re-education 25 Instructed in home balance and strength program; discussed to do 2x/day and that we will consider seeking OT eval for fine motor control and cognitive assessment   Therapeutic Activity     Therapeutic Exercises     Gait training     Modality__________________                Total 55    Blank areas are intentional and mean the treatment did not include these items.     PT Evaluation Code: (Please list factors)  Patient History/Comorbidities:   Patient Active Problem List   Diagnosis     Ulcerative Colitis      Major Depression, Single Episode In Remission     Generalized Anxiety Disorder     Vitamin D Deficiency     Osteoporosis     Pulmonary nodule     Viral URI     Visit for screening mammogram       Examination: unsteady gait, unsteady dynamic standing activity  Clinical Presentation: stable  Clinical Decision Making: low    Patient History/  Comorbidities Examination  (body structures and functions, activity limitations, and/or participation restrictions) Clinical Presentation Clinical Decision Making (Complexity)   No documented Comorbidities or personal factors 1-2 Elements Stable and/or uncomplicated Low   1-2 documented comorbidities or personal factor 3 Elements Evolving clinical presentation with changing characteristics Moderate   3-4 documented comorbidities or personal factors 4 or more Unstable and unpredictable High                  Tahira Baeza, PT  9/29/2020  8:55 AM

## 2021-06-11 NOTE — TELEPHONE ENCOUNTER
FYI - Status Update  Who is Calling: Patient  Update: Questioning if refill can be processed today due to being out of medication.  Okay to leave a detailed message?:  No return call needed

## 2021-06-12 NOTE — PROGRESS NOTES
Assessment/Plan:        Diagnoses and all orders for this visit:    Cough        We will treat her with Z-Tony.  Discussed regarding medication use and side effects.  Add yogurt in her diet.  We will also provide her with benzonatate and use as needed for coughing fits.  Consider smoking cessation and discussed risk associated with smoking.  Recheck with PCP in 1 week if persistent or worse.  Fluid hydration.  She was agreeable with the plans.  Subjective:    Patient ID: Dory Abrams is a 57 y.o. female.    CRISTINA Connors is here with concerns about cough, dizziness.  She started having symptoms 2 and half weeks ago.  Was attending a meeting at that time and suddenly felt dizzy, room was spinning.  She also had some nausea.  No vomiting.  After she completed the meeting, went home.  Mostly bedrest until the weekend.  She also noted diarrhea almost up to 10 times per day which lasted for around 9 days.  Diarrhea has improved.  No exposure to anyone with similar symptoms.  No changes in her diet.  No recent travel or antibiotic use.  She had most of her colon removed except for 2 inches because of ulcerative colitis.  Bowel movements are now at her baseline.  She also started having a dry cough and sometimes with bouts of coughing.  Difficulty with sleep at night.  Sinus congestion, headache and ears feeling plugged.  She has times where she felt that her balance was off and has hold onto surfaces.  No episodes of fainting.  She hurts in her ribs, chest or coughing.  History of smoking up to a pack per day since she was 13 years of age.  Down to 4 cigarettes per day since 2013.  Unable to check her temperature but episodes where she felt hot, sweaty and cold.  This improved yesterday.  Has been taking NyQuil, Aleve with minimal benefit.  Trying to hydrate herself well.  Able to tolerate food.  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    Review of Systems  As above otherwise negative.           Objective:    Physical Exam  /70 (Patient Site: Left Arm, Patient Position: Sitting, Cuff Size: Adult Regular)  Pulse 93  Temp 98.2  F (36.8  C) (Oral)   Wt 140 lb 9.6 oz (63.8 kg)  SpO2 96%  Breastfeeding? No  BMI 22.69 kg/m2    Vital signs noted above. AAO ×3.  HEENT no nasal discharge, moist oral mucosa. Ears:TMs intact, no fluid collection. Neck: Supple neck, nonpalpable cervical lymph nodes.  Lungs: Clear to auscultation bilateral.  Heart: S1-S2 regular rate and rhythm, no murmurs were noted.  Abdomen:  with bowel sounds.

## 2021-06-12 NOTE — PROGRESS NOTES
[facility] Daily Progress     Patient Name: Dory Abrams  Date: 10/20/2020  Visit #: 5/12  Misty date: 9/29/2020  Referral Diagnosis: Ataxia  Referring provider: Ave Mcdonough MD  Visit Diagnosis:     ICD-10-CM    1. Unsteady gait  R26.81    2. Difficulty balancing  R29.818    3. Decreased range of motion of both lower extremities  M25.661     M25.662    4. Lack of coordination  R27.9        Assessment:     Today patient hasn't been seen in 2 weeks, reporting she feels exercises beneficial, enjoying HEP and was able to demonstrate strength in standing balance too, as well as instability. Patient wants to continue to work on stairs and more gait training in next sessions. Patient came in today, complaining of R knee pain, so addressed that today with HEP and Ktape, will assess at next visit.     Unsteady with dynamic gait training and static standing with eyes closed or on uneven surfaces.      HEP/POC compliance is  good .  Patient demonstrates understanding/independence with home program.  Patient is appropriate to continue with skilled physical therapy intervention, as indicated by initial plan of care.    Goal Status:  Pt. will demonstrate/verbalize independence in self-management of condition in : 12 weeks  Pt. will be independent with home exercise program in : 12 weeks  Pt. will show improved balance for safer : ambulation;for chores;in 12 weeks  Pt. will be able to walk : 1 block;for community mobility;with less difficulty;in 12 weeks;Comment  Comment:: determine if assistive device indicated  Patient will ascend / descend: stairs;with less difficulty;in 12 weeks;Comment  Comment:: using railing and no falls  Patient will transfer: sit/stand;for in/out of chair;in 12 weeks;Comment  Comment: increased strength demonstrated by 30 second sit/stand test of 6x or greater (currently at 5x)    Patient will increase : Tinnetti score;in 12 weeks;by _ points  by ___ points: 3        Plan /  Patient Education:     Continue with initial plan of care.  Progress with home program as tolerated.    Plan for next visit: balance training in static and dynamic standing positions with variation of head position as this seems to trigger unsteadiness.  Further gait assessment and training in varying positions as patient is very active at home and currently packing to move to [place], [place] in the next few weeks.   helpful and assists her with gait steadiness most of the time.    -stair and step training for weight shifting, more gait training on unsteady surfaces or with head turns    Subjective:     Patient feeling like her R knee has been bothering her the most. Patient has been spending most of her week packing and has been having knee symptoms more too, not able to find a comfortable sleep position at night.     Patient still having good and bad days, felt some tiredness after last visit but it was appropriate and had a little knee soreness. Patient is moving to [place] on October 29th and will continue therapy there at the time. Balance was off for her over the weekends. Patient feeling better with her balance in the mornings.       Living Situation:single family home, lives with others , stairs  with railing, has assistance Yes and  took month off of work to help patient and with moving process        Occupation:not working-last time worked was 12/22/2019        Work Status:NA        Equipment Available: SE cane and walker has these at home and has used only a few times    Objective:     Gait Observation:  Patient walks slowly and deliberately with short stride gait pattern.  Patient posture is somewhat rigid during gait without normal arm swing. Patient can turn directions at slow speed, but can experience some dizziness upon doing so.   Tandem, sidestep, backward, and march with support from PT and very slow.  Loses balance occasionally, but not fall.    Treatment Today    "      Exercises:  Exercise #1: standing heel and toes 10x at parallel bars  Comment #1: standing on foam eyes open x 30 seconds, eyes closed 15 seconds WBOS and NBOS no hand hold needed but Eyes closed  Exercise #2: standing hip extension bilateral 10x 2-3x/day  Comment #2: stairs up/down in gym without rails 5x without stopping  Exercise #3: sidestepping walk at kitch counter down and back 4x  2-3x/day  Comment #3: in clinic:  balance training on foam in parallel bars, with head in various positions up/down (right and left) CGA; single leg stance;  Exercise #4: sit to stand - 10 reps 2 sets  Comment #4: step ups with leg lift - 10 reps bilaterally  Exercise #5: tandem walk fowards backwards - 40ft x 4 (arms outstretched)  Comment #5: nustep x 4' at level 5; seat at 6; good speed  Exercise #6: sidestep walk 20 ft x with eyes forward, head turned up/left, head turned up/right, head turned down/left, head turned down/right  Comment #6: forward walk 20 ft x with eyes forward, head turned up/left, head turned up/right, head turned down/left, head turned down/right  Exercise #7: backward walk 20 ft x with eyes forward, head turned up/left, head turned up/right  Comment #7: high knee march forward \"on tightrope\" but not tandem x 20 ft  Exercise #8: quad set and SAQ - hold 5 sec x 10  Comment #8: SLR - 10 reps  Exercise #9: supine active hamstring stretch - 10-20 reps  Comment #9: seated hamstring stretch - hold 30 sec        TREATMENT MINUTES COMMENTS   Evaluation     Self-care/ Home management     Manual therapy     Neuromuscular Re-education 8 balance and ambulation with varying head position and direction and uneven and even surfaces    Ktape I strip to R patellar tendon for support   Therapeutic Activity     Therapeutic Exercises 20 See above flowsheet  [id number]    Nustep 4 minutes level 5   Gait training  High level gait drills in varying positons-see neuromuscular re ed section   Modality__________________        "         Total 28    Blank areas are intentional and mean the treatment did not include these items.       Janice Bonilla, PT  10/20/2020

## 2021-06-12 NOTE — TELEPHONE ENCOUNTER
RN cannot approve Refill Request    RN can NOT refill this medication PCP messaged that patient is overdue for Labs. Last office visit: 7/20/2020 Ave Mcdonough MD Last Physical: 2/27/2017 Last MTM visit: Visit date not found Last visit same specialty: 7/20/2020 Ave Mcdonough MD.  Next visit within 3 mo: Visit date not found  Next physical within 3 mo: Visit date not found      Yasmeen TORRES Marisela, [facility] Triage/Med Refill 10/30/2020    Requested Prescriptions   Pending Prescriptions Disp Refills     venlafaxine (EFFEXOR-XR) 75 MG 24 hr capsule 180 capsule 1     Sig: Take 2 capsules (150 mg total) by mouth daily.       Venlafaxine/Desvenlafaxine Refill Protocol Failed - 10/30/2020 10:26 AM        Failed - Fasting lipid cascade in last year     Cholesterol   Date Value Ref Range Status   02/27/2017 225 (H) <=199 mg/dL Final     Triglycerides   Date Value Ref Range Status   02/27/2017 171 (H) <=149 mg/dL Final     HDL Cholesterol   Date Value Ref Range Status   02/27/2017 50 >=50 mg/dL Final     LDL Calculated   Date Value Ref Range Status   02/27/2017 141 (H) <=129 mg/dL Final     Patient Fasting > 8hrs?   Date Value Ref Range Status   02/27/2017 Yes  Final   02/27/2017 Yes  Final             Passed - LFT or AST or ALT in last year     Albumin   Date Value Ref Range Status   07/20/2020 3.6 3.5 - 5.0 g/dL Final     Bilirubin, Total   Date Value Ref Range Status   07/20/2020 0.1 0.0 - 1.0 mg/dL Final     Bilirubin, Direct   Date Value Ref Range Status   07/27/2012 <0.1 <0.6 mg/dL Final     Alkaline Phosphatase   Date Value Ref Range Status   07/20/2020 85 45 - 120 U/L Final     AST   Date Value Ref Range Status   07/20/2020 13 0 - 40 U/L Final     ALT   Date Value Ref Range Status   07/20/2020 <9 0 - 45 U/L Final     Protein, Total   Date Value Ref Range Status   07/20/2020 7.1 6.0 - 8.0 g/dL Final                Passed - PCP or prescribing provider visit in last year     Last office visit with  prescriber/PCP: 7/20/2020 Ave Mcdonough MD OR same dept: 7/20/2020 Ave Mcdonough MD OR same specialty: 7/20/2020 Ave Mcdonough MD  Last physical: 2/27/2017 Last MTM visit: Visit date not found   Next visit within 3 mo: Visit date not found  Next physical within 3 mo: Visit date not found  Prescriber OR PCP: Ave Mcdonough MD  Last diagnosis associated with med order: 1. Insomnia, unspecified type  - traZODone (DESYREL) 50 MG tablet; Take between 1-2 tablets ( mg) by mouth at bedtime.  Dispense: 180 tablet; Refill: 2    2. Generalized anxiety disorder  - buPROPion (WELLBUTRIN SR) 150 MG 12 hr tablet; TAKE 1 TABLET BY MOUTH TWICE DAILY. OFFICE VISIT REQUIRED FOR FURTHER REFILLS  Dispense: 180 tablet; Refill: 2  - venlafaxine (EFFEXOR-XR) 75 MG 24 hr capsule; Take 2 capsules (150 mg total) by mouth daily.  Dispense: 180 capsule; Refill: 1    3. Major Depression, Single Episode In Remission  - venlafaxine (EFFEXOR-XR) 75 MG 24 hr capsule; Take 2 capsules (150 mg total) by mouth daily.  Dispense: 180 capsule; Refill: 1    If protocol passes may refill for 12 months if within 3 months of last provider visit (or a total of 15 months).             Passed - Blood Pressure in last year     BP Readings from Last 1 Encounters:   07/20/20 110/72              Signed Prescriptions Disp Refills    traZODone (DESYREL) 50 MG tablet 180 tablet 2     Sig: Take between 1-2 tablets ( mg) by mouth at bedtime.       Tricyclics/Misc Antidepressant/Antianxiety Meds Refill Protocol Passed - 10/30/2020 10:26 AM        Passed - PCP or prescribing provider visit in last year     Last office visit with prescriber/PCP: 7/20/2020 Ave Mcdonough MD OR same dept: 7/20/2020 Ave Mcdonough MD OR same specialty: 7/20/2020 Ave Mcdonough MD  Last physical: 2/27/2017 Last MTM visit: Visit date not found   Next visit within 3 mo: Visit date not found  Next physical within 3 mo: Visit date not  found  Prescriber OR PCP: Ave Mcdonough MD  Last diagnosis associated with med order: 1. Insomnia, unspecified type  - traZODone (DESYREL) 50 MG tablet; Take between 1-2 tablets ( mg) by mouth at bedtime.  Dispense: 180 tablet; Refill: 2    2. Generalized anxiety disorder  - buPROPion (WELLBUTRIN SR) 150 MG 12 hr tablet; TAKE 1 TABLET BY MOUTH TWICE DAILY. OFFICE VISIT REQUIRED FOR FURTHER REFILLS  Dispense: 180 tablet; Refill: 2  - venlafaxine (EFFEXOR-XR) 75 MG 24 hr capsule; Take 2 capsules (150 mg total) by mouth daily.  Dispense: 180 capsule; Refill: 1    3. Major Depression, Single Episode In Remission  - venlafaxine (EFFEXOR-XR) 75 MG 24 hr capsule; Take 2 capsules (150 mg total) by mouth daily.  Dispense: 180 capsule; Refill: 1    If protocol passes may refill for 12 months if within 3 months of last provider visit (or a total of 15 months).               buPROPion (WELLBUTRIN SR) 150 MG 12 hr tablet 180 tablet 2     Sig: TAKE 1 TABLET BY MOUTH TWICE DAILY. OFFICE VISIT REQUIRED FOR FURTHER REFILLS       Tricyclics/Misc Antidepressant/Antianxiety Meds Refill Protocol Passed - 10/30/2020 10:26 AM        Passed - PCP or prescribing provider visit in last year     Last office visit with prescriber/PCP: 7/20/2020 Ave Mcdonough MD OR same dept: 7/20/2020 Ave Mcdonough MD OR same specialty: 7/20/2020 Ave Mcdonough MD  Last physical: 2/27/2017 Last MTM visit: Visit date not found   Next visit within 3 mo: Visit date not found  Next physical within 3 mo: Visit date not found  Prescriber OR PCP: Ave Mcdonough MD  Last diagnosis associated with med order: 1. Insomnia, unspecified type  - traZODone (DESYREL) 50 MG tablet; Take between 1-2 tablets ( mg) by mouth at bedtime.  Dispense: 180 tablet; Refill: 2    2. Generalized anxiety disorder  - buPROPion (WELLBUTRIN SR) 150 MG 12 hr tablet; TAKE 1 TABLET BY MOUTH TWICE DAILY. OFFICE VISIT REQUIRED FOR FURTHER REFILLS  Dispense: 180  tablet; Refill: 2  - venlafaxine (EFFEXOR-XR) 75 MG 24 hr capsule; Take 2 capsules (150 mg total) by mouth daily.  Dispense: 180 capsule; Refill: 1    3. Major Depression, Single Episode In Remission  - venlafaxine (EFFEXOR-XR) 75 MG 24 hr capsule; Take 2 capsules (150 mg total) by mouth daily.  Dispense: 180 capsule; Refill: 1    If protocol passes may refill for 12 months if within 3 months of last provider visit (or a total of 15 months).

## 2021-06-12 NOTE — TELEPHONE ENCOUNTER
RN cannot approve Refill Request    RN can NOT refill this medication Protocol failed and NO refill given. Last office visit: 7/20/2020 Ave Mcdonuogh MD Last Physical: 2/27/2017 Last MTM visit: Visit date not found Last visit same specialty: Visit date not found.  Next visit within 3 mo: Visit date not found  Next physical within 3 mo: Visit date not found      Shayla Charles, [facility] Triage/Med Refill 10/9/2020    Requested Prescriptions   Pending Prescriptions Disp Refills     venlafaxine (EFFEXOR-XR) 75 MG 24 hr capsule 180 capsule 1     Sig: Take 2 capsules (150 mg total) by mouth daily.       Venlafaxine/Desvenlafaxine Refill Protocol Failed - 10/7/2020 12:59 PM        Failed - Fasting lipid cascade in last year     Cholesterol   Date Value Ref Range Status   02/27/2017 225 (H) <=199 mg/dL Final     Triglycerides   Date Value Ref Range Status   02/27/2017 171 (H) <=149 mg/dL Final     HDL Cholesterol   Date Value Ref Range Status   02/27/2017 50 >=50 mg/dL Final     LDL Calculated   Date Value Ref Range Status   02/27/2017 141 (H) <=129 mg/dL Final     Patient Fasting > 8hrs?   Date Value Ref Range Status   02/27/2017 Yes  Final   02/27/2017 Yes  Final             Passed - LFT or AST or ALT in last year     Albumin   Date Value Ref Range Status   07/20/2020 3.6 3.5 - 5.0 g/dL Final     Bilirubin, Total   Date Value Ref Range Status   07/20/2020 0.1 0.0 - 1.0 mg/dL Final     Bilirubin, Direct   Date Value Ref Range Status   07/27/2012 <0.1 <0.6 mg/dL Final     Alkaline Phosphatase   Date Value Ref Range Status   07/20/2020 85 45 - 120 U/L Final     AST   Date Value Ref Range Status   07/20/2020 13 0 - 40 U/L Final     ALT   Date Value Ref Range Status   07/20/2020 <9 0 - 45 U/L Final     Protein, Total   Date Value Ref Range Status   07/20/2020 7.1 6.0 - 8.0 g/dL Final                Passed - PCP or prescribing provider visit in last year     Last office visit with prescriber/PCP: 7/20/2020 Ceasar  Ave VELASCO MD OR same dept: Visit date not found OR same specialty: Visit date not found  Last physical: 2/27/2017 Last MTM visit: Visit date not found   Next visit within 3 mo: Visit date not found  Next physical within 3 mo: Visit date not found  Prescriber OR PCP: Ave Mcdonough MD  Last diagnosis associated with med order: 1. Major Depression, Single Episode In Remission  - venlafaxine (EFFEXOR-XR) 75 MG 24 hr capsule; Take 2 capsules (150 mg total) by mouth daily.  Dispense: 180 capsule; Refill: 1    2. Generalized anxiety disorder  - venlafaxine (EFFEXOR-XR) 75 MG 24 hr capsule; Take 2 capsules (150 mg total) by mouth daily.  Dispense: 180 capsule; Refill: 1    If protocol passes may refill for 12 months if within 3 months of last provider visit (or a total of 15 months).             Passed - Blood Pressure in last year     BP Readings from Last 1 Encounters:   07/20/20 110/72

## 2021-06-12 NOTE — PROGRESS NOTES
[facility] Daily Progress     Patient Name: Dory Abrams  Date: 10/6/2020  Visit #: 3/12  Referral Diagnosis: Ataxia  Referring provider: Johanna Padron MD  Visit Diagnosis:     ICD-10-CM    1. Unsteady gait  R26.81    2. Difficulty balancing  R29.818    3. Decreased range of motion of both lower extremities  M25.661     M25.662    4. Lack of coordination  R27.9        Assessment:     Today patient reporting no change, enjoying HEP and was able to demonstrate strength in standing balance too, as well as instability. Patient wants to work on stairs and more gait training in next sessions.       HEP/POC compliance is  good .  Patient demonstrates understanding/independence with home program.  Patient is appropriate to continue with skilled physical therapy intervention, as indicated by initial plan of care.    Goal Status:  Pt. will demonstrate/verbalize independence in self-management of condition in : 12 weeks  Pt. will be independent with home exercise program in : 12 weeks  Pt. will show improved balance for safer : ambulation;for chores;in 12 weeks  Pt. will be able to walk : 1 block;for community mobility;with less difficulty;in 12 weeks;Comment  Comment:: determine if assistive device indicated  Patient will ascend / descend: stairs;with less difficulty;in 12 weeks;Comment  Comment:: using railing and no falls  Patient will transfer: sit/stand;for in/out of chair;in 12 weeks;Comment  Comment: increased strength demonstrated by 30 second sit/stand test of 6x or greater (currently at 5x)    Patient will increase : Tinnetti score;in 12 weeks;by _ points  by ___ points: 3        Plan / Patient Education:     Continue with initial plan of care.  Progress with home program as tolerated.    Plan for next visit: balance training in static and dynamic standing positions with variation of head position as this seems to trigger unsteadiness.  Further gait assessment and training in varying  positions as patient is very active at home and currently packing to move to [place], [place] in the next few weeks.   helpful and assists her with gait steadiness most of the time.    -stair and step training for weight shifting, more gait training on unsteady surfaces or with head turns    Subjective:     Patient still having good and bad days, felt some tiredness after last visit but it was appropriate. Patient is moving to [place] on October 29th and will continue therapy there at the time. Balance was off for her over the weekends. Patient feeling better with her balance in the mornings.       Living Situation:single family home, lives with others , stairs  with railing, has assistance Yes and  took month off of work to help patient and with moving process        Occupation:not working-last time worked was 12/22/2019        Work Status:NA        Equipment Available: SE cane and walker has these at home and has used only a few times    Objective:     Gait Observation:  Patient walks slowly and deliberately with short stride gait pattern.  Patient posture is somewhat rigid during gait without normal arm swing. Patient can turn directions at slow speed, but can experience some dizziness upon doing so.   Tandem gait with support only and very slow.    Treatment Today         Exercises:  Exercise #1: standing hip flexion bilateral 10x 2-3x/day  Comment #1: standing hip abduction bilateral  10x 2-3x/day  Exercise #2: standing hip extension bilateral 10x 2-3x/day  Comment #2: standing heel raises bilteral 10x -2-3x/day  Exercise #3: sidestepping walk at kitch counter down and back 4x  2-3x/day  Comment #3: in clinic:  balance training on foam in parallel bars, with head in various positions up/down (right and left) CGA; single leg stance;  Exercise #4: sit to stand - 10 reps 2 sets  Comment #4: step ups with leg lift - 10 reps bilaterally  Exercise #5: tandem walk fowards backwards - 40ft x  4        TREATMENT MINUTES COMMENTS   Evaluation     Self-care/ Home management     Manual therapy     Neuromuscular Re-education 10 Tandem balance and ambulation   Therapeutic Activity     Therapeutic Exercises 15 See above flowsheet  [id number]    Nustep 4 minutes    Gait training 5 Tandem walking    Modality__________________                Total 30    Blank areas are intentional and mean the treatment did not include these items.       Janice Bonilla, PT  10/6/2020

## 2021-06-12 NOTE — PROGRESS NOTES
[facility] Discharge Summary  Patient Name: Dory Abrams  Date: 10/29/2020  Referral Diagnosis: Ataxia  Referring provider: Johanna Padron MD  Visit Diagnosis:   1. Unsteady gait     2. Difficulty balancing     3. Decreased range of motion of both lower extremities     4. Lack of coordination         Goals:  Pt. will demonstrate/verbalize independence in self-management of condition in : 12 weeks  Pt. will be independent with home exercise program in : 12 weeks  Pt. will show improved balance for safer : ambulation;for chores;in 12 weeks  Pt. will be able to walk : 1 block;for community mobility;with less difficulty;in 12 weeks;Comment  Comment:: determine if assistive device indicated  Patient will ascend / descend: stairs;with less difficulty;in 12 weeks;Comment  Comment:: using railing and no falls  Patient will transfer: sit/stand;for in/out of chair;in 12 weeks;Comment  Comment: increased strength demonstrated by 30 second sit/stand test of 6x or greater (currently at 5x)    Patient will increase : Tinnetti score;in 12 weeks;by _ points  by ___ points: 3    Tinnetti Total Score (calculated): 27    Lower Extremity Functional Scale (_/80): 58  Improved 5 points from 55      Patient was seen for 8 visits from 9/29/20 to 10/29/20 with 0 missed appointments.  Patient received a home program and demonstrates good understanding of it.  Patient will now be discontinuing PT in [place] due to moved to [place].  She will continue with PT in [place] to continue to work on goals.    Therapy will be discontinued at this time.  The patient will need a new referral to resume.    Thank you for your referral.  Tahira Baeza, PT  10/29/2020  10:35 AM    [facility] Rehabilitation Daily Progress     Patient Name: Dory Abrams  Date: 10/29/2020  Visit #: 8/12  Eval date: 9/29/2020  Referral Diagnosis: Ataxia  Referring provider: Johanna Padron MD  Visit Diagnosis:     ICD-10-CM    1. Unsteady gait   R26.81    2. Difficulty balancing  R29.818    3. Decreased range of motion of both lower extremities  M25.661     M25.662    4. Lack of coordination  R27.9        Assessment:     Patient 90% moved into new home.  Knee brace helpful for right knee pain-wears intermittently    HEP/POC compliance is  good .  Patient demonstrates understanding/independence with home program.  Patient is appropriate to continue with skilled physical therapy intervention, as indicated by initial plan of care.  Has met some goals and continues to make good progress towards all goals.    Goal Status:  Pt. will demonstrate/verbalize independence in self-management of condition in : 12 weeks  Pt. will be independent with home exercise program in : 12 weeks  Pt. will show improved balance for safer : ambulation;for chores;in 12 weeks  Pt. will be able to walk : 1 block;for community mobility;with less difficulty;in 12 weeks;Comment  Comment:: determine if assistive device indicated  Patient will ascend / descend: stairs;with less difficulty;in 12 weeks;Comment  Comment:: using railing and no falls  Patient will transfer: sit/stand;for in/out of chair;in 12 weeks;Comment  Comment: increased strength demonstrated by 30 second sit/stand test of 6x or greater (currently at 5x)    Patient will increase : Tinnetti score;in 12 weeks;by _ points  by ___ points: 3        Plan / Patient Education:   Last PT session here at [facility] and will be transferring to PT in [place].  Continue with high level gait drills with varying head positions and directions  Treadmill   Balance ball seated  Stairs and foam balance for standing balance drills    Subjective:   Reports doesn't think about doing the stairs anymore, doesn't get as dizzy as she used to and able to walk easier now.    0 pain today  Planning to move to [place] in 6 months to be near daughter    Living Situation:single family home, lives with others , stairs  with railing, has assistance  Yes and  took month off of work to help patient and with moving process        Occupation:not working-last time worked was 12/22/2019        Work Status:NA        Equipment Available: SE cane and walker has these at home and has used only a few times    Objective:     Gait Observation:  Patient walks with good gait speed and is cautious when turning. Patient posture is somewhat rigid during gait without normal arm swing. Patient can turn directions at slow speed, but can experience some dizziness upon doing so.   Tandem, sidestep, backward, and march with support from PT and slow, but improving.  Loses balance occasionally, but not fall.    Treatment Today         Exercises:    Exercises:  Exercise #1: standing heel and toes 10x at parallel bars  Comment #1: standing on foam eyes open x 30 seconds, eyes closed 15 seconds WBOS and NBOS no hand hold needed but Eyes closed  Exercise #2: standing hip extension bilateral 10x 2-3x/day  Comment #2: stairs up/down in gym without rails 5x without stopping no rails  Exercise #3: sidestepping walk at [employer] counter down and back 4x  2-3x/day  Comment #3: in clinic:  balance training on foam in parallel bars, with head in various positions up/down (right and left) CGA; single leg stance;  Exercise #4: sit to stand - 10 reps 2 sets  Comment #4: step ups with leg lift - 10 reps bilaterally  Exercise #5: tandem walk fowards backwards - 40ft x 4 (arms outstretched)  Comment #5: nustep x 4' at level 5; seat at 6; good speed  Exercise #6: sidestep walk 20 ft right and left each with the following: with eyes forward, head turned up/left, head turned up/right  Comment #6: forward walk 20 ft x with eyes forward, head turned up/left, head turned up/right, head turned down/left, head turned down/right  Exercise #7: backward walk 20 ft x 2 with eyes forward with CGA; eyes right/up and eyes left/up  Comment #7: walk forward on tightrope 20 ft with CGA then with head up/left, head  up/right  Exercise #8: treadmill x 5' with varying speed 1.5-2.0 mph and incline 1-2  Comment #8: standing on foam WBOS and NBOS  1x with eyes closed x 30 seconds and single leg stance x 5 seconds with CGA 3 reps each leg  Exercise #9: seated on large gym ball with lateral pelvic shifts x 10, march x 10, LAQ x 10  Comment #9: added mini trampoline for heel raises and march x 10 each with light hold and SBA              TREATMENT MINUTES COMMENTS   Evaluation     Self-care/ Home management     Manual therapy     Neuromuscular Re-education 15 balance and ambulation with varying head position and direction and uneven and even surfaces       Therapeutic Activity     Therapeutic Exercises  See above flowsheet  [id number]    Treadmill   Gait training 10 High level gait drills in varying positons-see neuromuscular re ed section   Modality__________________                Total 25    Blank areas are intentional and mean the treatment did not include these items.       Tahira Baeza, PT  10/29/2020

## 2021-06-12 NOTE — TELEPHONE ENCOUNTER
Refill Approved  New pharmacy   Rx renewed per Medication Renewal Policy. Medication was last renewed on Trazodone 9/10/2020, Wellbutrin 9/25/2020 .    Yasmeen A Marisela, [facility] Triage/Med Refill 10/30/2020     Requested Prescriptions   Pending Prescriptions Disp Refills     traZODone (DESYREL) 50 MG tablet 60 tablet 9     Sig: Take between 1-2 tablets ( mg) by mouth at bedtime.       Tricyclics/Misc Antidepressant/Antianxiety Meds Refill Protocol Passed - 10/30/2020 10:26 AM        Passed - PCP or prescribing provider visit in last year     Last office visit with prescriber/PCP: 7/20/2020 Ave Mcdonough MD OR same dept: 7/20/2020 Ave Mcdonough MD OR same specialty: 7/20/2020 Ave Mcdonough MD  Last physical: 2/27/2017 Last MTM visit: Visit date not found   Next visit within 3 mo: Visit date not found  Next physical within 3 mo: Visit date not found  Prescriber OR PCP: Ave Mcdonough MD  Last diagnosis associated with med order: 1. Insomnia, unspecified type  - traZODone (DESYREL) 50 MG tablet; Take between 1-2 tablets ( mg) by mouth at bedtime.  Dispense: 60 tablet; Refill: 9    2. Generalized anxiety disorder  - buPROPion (WELLBUTRIN SR) 150 MG 12 hr tablet; TAKE 1 TABLET BY MOUTH TWICE DAILY. OFFICE VISIT REQUIRED FOR FURTHER REFILLS  Dispense: 180 tablet; Refill: 3  - venlafaxine (EFFEXOR-XR) 75 MG 24 hr capsule; Take 2 capsules (150 mg total) by mouth daily.  Dispense: 180 capsule; Refill: 1    3. Major Depression, Single Episode In Remission  - venlafaxine (EFFEXOR-XR) 75 MG 24 hr capsule; Take 2 capsules (150 mg total) by mouth daily.  Dispense: 180 capsule; Refill: 1    If protocol passes may refill for 12 months if within 3 months of last provider visit (or a total of 15 months).                buPROPion (WELLBUTRIN SR) 150 MG 12 hr tablet 180 tablet 3     Sig: TAKE 1 TABLET BY MOUTH TWICE DAILY. OFFICE VISIT REQUIRED FOR FURTHER REFILLS       Tricyclics/Misc  Antidepressant/Antianxiety Meds Refill Protocol Passed - 10/30/2020 10:26 AM        Passed - PCP or prescribing provider visit in last year     Last office visit with prescriber/PCP: 7/20/2020 Ave Mcdonough MD OR same dept: 7/20/2020 Ave Mcdonough MD OR same specialty: 7/20/2020 Ave Mcdonough MD  Last physical: 2/27/2017 Last MTM visit: Visit date not found   Next visit within 3 mo: Visit date not found  Next physical within 3 mo: Visit date not found  Prescriber OR PCP: Ave Mcdonough MD  Last diagnosis associated with med order: 1. Insomnia, unspecified type  - traZODone (DESYREL) 50 MG tablet; Take between 1-2 tablets ( mg) by mouth at bedtime.  Dispense: 60 tablet; Refill: 9    2. Generalized anxiety disorder  - buPROPion (WELLBUTRIN SR) 150 MG 12 hr tablet; TAKE 1 TABLET BY MOUTH TWICE DAILY. OFFICE VISIT REQUIRED FOR FURTHER REFILLS  Dispense: 180 tablet; Refill: 3  - venlafaxine (EFFEXOR-XR) 75 MG 24 hr capsule; Take 2 capsules (150 mg total) by mouth daily.  Dispense: 180 capsule; Refill: 1    3. Major Depression, Single Episode In Remission  - venlafaxine (EFFEXOR-XR) 75 MG 24 hr capsule; Take 2 capsules (150 mg total) by mouth daily.  Dispense: 180 capsule; Refill: 1    If protocol passes may refill for 12 months if within 3 months of last provider visit (or a total of 15 months).                venlafaxine (EFFEXOR-XR) 75 MG 24 hr capsule 180 capsule 1     Sig: Take 2 capsules (150 mg total) by mouth daily.       Venlafaxine/Desvenlafaxine Refill Protocol Failed - 10/30/2020 10:26 AM        Failed - Fasting lipid cascade in last year     Cholesterol   Date Value Ref Range Status   02/27/2017 225 (H) <=199 mg/dL Final     Triglycerides   Date Value Ref Range Status   02/27/2017 171 (H) <=149 mg/dL Final     HDL Cholesterol   Date Value Ref Range Status   02/27/2017 50 >=50 mg/dL Final     LDL Calculated   Date Value Ref Range Status   02/27/2017 141 (H) <=129 mg/dL Final      Patient Fasting > 8hrs?   Date Value Ref Range Status   02/27/2017 Yes  Final   02/27/2017 Yes  Final             Passed - LFT or AST or ALT in last year     Albumin   Date Value Ref Range Status   07/20/2020 3.6 3.5 - 5.0 g/dL Final     Bilirubin, Total   Date Value Ref Range Status   07/20/2020 0.1 0.0 - 1.0 mg/dL Final     Bilirubin, Direct   Date Value Ref Range Status   07/27/2012 <0.1 <0.6 mg/dL Final     Alkaline Phosphatase   Date Value Ref Range Status   07/20/2020 85 45 - 120 U/L Final     AST   Date Value Ref Range Status   07/20/2020 13 0 - 40 U/L Final     ALT   Date Value Ref Range Status   07/20/2020 <9 0 - 45 U/L Final     Protein, Total   Date Value Ref Range Status   07/20/2020 7.1 6.0 - 8.0 g/dL Final                Passed - PCP or prescribing provider visit in last year     Last office visit with prescriber/PCP: 7/20/2020 Ave Mcdonough MD OR same dept: 7/20/2020 Ave Mcdonough MD OR same specialty: 7/20/2020 Ave Mcdonough MD  Last physical: 2/27/2017 Last MTM visit: Visit date not found   Next visit within 3 mo: Visit date not found  Next physical within 3 mo: Visit date not found  Prescriber OR PCP: Ave Mcdonough MD  Last diagnosis associated with med order: 1. Insomnia, unspecified type  - traZODone (DESYREL) 50 MG tablet; Take between 1-2 tablets ( mg) by mouth at bedtime.  Dispense: 60 tablet; Refill: 9    2. Generalized anxiety disorder  - buPROPion (WELLBUTRIN SR) 150 MG 12 hr tablet; TAKE 1 TABLET BY MOUTH TWICE DAILY. OFFICE VISIT REQUIRED FOR FURTHER REFILLS  Dispense: 180 tablet; Refill: 3  - venlafaxine (EFFEXOR-XR) 75 MG 24 hr capsule; Take 2 capsules (150 mg total) by mouth daily.  Dispense: 180 capsule; Refill: 1    3. Major Depression, Single Episode In Remission  - venlafaxine (EFFEXOR-XR) 75 MG 24 hr capsule; Take 2 capsules (150 mg total) by mouth daily.  Dispense: 180 capsule; Refill: 1    If protocol passes may refill for 12 months if within 3  months of last provider visit (or a total of 15 months).             Passed - Blood Pressure in last year     BP Readings from Last 1 Encounters:   07/20/20 110/72

## 2021-06-12 NOTE — PROGRESS NOTES
[facility] Rehabilitation Daily Progress     Patient Name: Dory Abrams  Date: 10/8/2020  Visit #: 4/12  Misty date: 9/29/2020  Referral Diagnosis: Ataxia  Referring provider: Johanna Padron MD  Visit Diagnosis:     ICD-10-CM    1. Unsteady gait  R26.81    2. Difficulty balancing  R29.818    3. Decreased range of motion of both lower extremities  M25.661     M25.662    4. Lack of coordination  R27.9        Assessment:     Today patient reporting she feels exercises beneficial, enjoying HEP and was able to demonstrate strength in standing balance too, as well as instability. Patient wants to continue to work on stairs and more gait training in next sessions.     Unsteady with dynamic gait training and static standing with eyes closed or on uneven surfaces.      HEP/POC compliance is  good .  Patient demonstrates understanding/independence with home program.  Patient is appropriate to continue with skilled physical therapy intervention, as indicated by initial plan of care.    Goal Status:  Pt. will demonstrate/verbalize independence in self-management of condition in : 12 weeks  Pt. will be independent with home exercise program in : 12 weeks  Pt. will show improved balance for safer : ambulation;for chores;in 12 weeks  Pt. will be able to walk : 1 block;for community mobility;with less difficulty;in 12 weeks;Comment  Comment:: determine if assistive device indicated  Patient will ascend / descend: stairs;with less difficulty;in 12 weeks;Comment  Comment:: using railing and no falls  Patient will transfer: sit/stand;for in/out of chair;in 12 weeks;Comment  Comment: increased strength demonstrated by 30 second sit/stand test of 6x or greater (currently at 5x)    Patient will increase : Tinnetti score;in 12 weeks;by _ points  by ___ points: 3        Plan / Patient Education:     Continue with initial plan of care.  Progress with home program as tolerated.    Plan for next visit: balance training in static and  dynamic standing positions with variation of head position as this seems to trigger unsteadiness.  Further gait assessment and training in varying positions as patient is very active at home and currently packing to move to [place], [place] in the next few weeks.   helpful and assists her with gait steadiness most of the time.    -stair and step training for weight shifting, more gait training on unsteady surfaces or with head turns    Subjective:     Patient still having good and bad days, felt some tiredness after last visit but it was appropriate and had a little knee soreness. Patient is moving to [place] on October 29th and will continue therapy there at the time. Balance was off for her over the weekends. Patient feeling better with her balance in the mornings.       Living Situation:single family home, lives with others , stairs  with railing, has assistance Yes and  took month off of work to help patient and with moving process        Occupation:not working-last time worked was 12/22/2019        Work Status:NA        Equipment Available: SE cane and walker has these at home and has used only a few times    Objective:     Gait Observation:  Patient walks slowly and deliberately with short stride gait pattern.  Patient posture is somewhat rigid during gait without normal arm swing. Patient can turn directions at slow speed, but can experience some dizziness upon doing so.   Tandem, sidestep, backward, and march with support from PT and very slow.  Loses balance occasionally, but not fall.    Treatment Today         Exercises:  Exercise #1: standing heel and toes 10x at parallel bars  Comment #1: standing on foam eyes open x 30 seconds, eyes closed 15 seconds WBOS and NBOS no hand hold needed but Eyes closed  Exercise #2: standing hip extension bilateral 10x 2-3x/day  Comment #2: stairs up/down in gym without rails 5x without stopping  Exercise #3: sidestepping walk at [employer] counter down and back  "4x  2-3x/day  Comment #3: in clinic:  balance training on foam in parallel bars, with head in various positions up/down (right and left) CGA; single leg stance;  Exercise #4: sit to stand - 10 reps 2 sets  Comment #4: step ups with leg lift - 10 reps bilaterally  Exercise #5: tandem walk fowards backwards - 40ft x 4 (arms outstretched)  Comment #5: nustep x 4' at level 5; seat at 6; good speed  Exercise #6: sidestep walk 20 ft x with eyes forward, head turned up/left, head turned up/right, head turned down/left, head turned down/right  Comment #6: forward walk 20 ft x with eyes forward, head turned up/left, head turned up/right, head turned down/left, head turned down/right  Exercise #7: backward walk 20 ft x with eyes forward, head turned up/left, head turned up/right  Comment #7: high knee march forward \"on tightrope\" but not tandem x 20 ft        TREATMENT MINUTES COMMENTS   Evaluation     Self-care/ Home management     Manual therapy     Neuromuscular Re-education 15 balance and ambulation with varying head position and direction and uneven and even surfaces   Therapeutic Activity     Therapeutic Exercises 10 See above flowsheet  [id number]    Nustep 4 minutes level 5   Gait training  High level gait drills in varying positons-see neuromuscular re ed section   Modality__________________                Total 30    Blank areas are intentional and mean the treatment did not include these items.       Tahira Baeza, PT  10/8/2020    "

## 2021-06-12 NOTE — PROGRESS NOTES
[facility] Rehabilitation Daily Progress     Patient Name: Dory Abrams  Date: 10/22/2020  Visit #: 6/12  Misty date: 9/29/2020  Referral Diagnosis: Ataxia  Referring provider: Johanna Padron MD  Visit Diagnosis:     ICD-10-CM    1. Unsteady gait  R26.81    2. Difficulty balancing  R29.818    3. Decreased range of motion of both lower extremities  M25.661     M25.662    4. Lack of coordination  R27.9        Assessment:     Patient 90% moved into new home.  New right knee pain-Ktape helpful    HEP/POC compliance is  good .  Patient demonstrates understanding/independence with home program.  Patient is appropriate to continue with skilled physical therapy intervention, as indicated by initial plan of care.    Goal Status:  Pt. will demonstrate/verbalize independence in self-management of condition in : 12 weeks  Pt. will be independent with home exercise program in : 12 weeks  Pt. will show improved balance for safer : ambulation;for chores;in 12 weeks  Pt. will be able to walk : 1 block;for community mobility;with less difficulty;in 12 weeks;Comment  Comment:: determine if assistive device indicated  Patient will ascend / descend: stairs;with less difficulty;in 12 weeks;Comment  Comment:: using railing and no falls  Patient will transfer: sit/stand;for in/out of chair;in 12 weeks;Comment  Comment: increased strength demonstrated by 30 second sit/stand test of 6x or greater (currently at 5x)    Patient will increase : Tinnetti score;in 12 weeks;by _ points  by ___ points: 3        Plan / Patient Education:     Continue with high level gait drills with varying head positions and directions  Treadmill   Balance ball seated  Stairs and foam balance for standing balance drills    Subjective:         Living Situation:single family home, lives with others , stairs  with railing, has assistance Yes and  took month off of work to help patient and with moving process        Occupation:not working-last time  worked was 12/22/2019        Work Status:NA        Equipment Available: SE cane and walker has these at home and has used only a few times    Objective:     Gait Observation:  Patient walks slowly and deliberately with short stride gait pattern.  Patient posture is somewhat rigid during gait without normal arm swing. Patient can turn directions at slow speed, but can experience some dizziness upon doing so.   Tandem, sidestep, backward, and march with support from PT and very slow.  Loses balance occasionally, but not fall.    Treatment Today         Exercises:    Comment #2: stairs up/down in gym without rails 5x without stopping    Exercise #5: tandem walk fowards backwards - 40ft x 4 (arms outstretched)  Exercise #6: sidestep walk 20 ft right and left each with the following: with eyes forward, head turned up/left, head turned up/right  Exercise #7: backward walk 20 ft x 2 with eyes forward with CGA; eyes right/up and eyes left/up  Comment #7: walk forward on tightrope 20 ft with CGA then with head up/left, head up/right    Exercise #8: treadmill x 5' with varying speed 1-1.5 mph and incline 1-2    Comment #8: standing on foam WBOS and NBOS with varying arm movements forward, rowing, heel and toe raises; 1x with eyes closed x 30 seconds  Exercise #9: seated on large gym ball with lateral pelvic shifts x 10, march x 10, LAQ x 5          TREATMENT MINUTES COMMENTS   Evaluation     Self-care/ Home management     Manual therapy     Neuromuscular Re-education 15 balance and ambulation with varying head position and direction and uneven and even surfaces       Therapeutic Activity     Therapeutic Exercises  See above flowsheet  [id number]    Treadmill   Gait training 10 High level gait drills in varying positons-see neuromuscular re ed section   Modality__________________                Total 25    Blank areas are intentional and mean the treatment did not include these items.       Tahira Baeza, PT  10/22/2020

## 2021-06-12 NOTE — TELEPHONE ENCOUNTER
FYI - Status Update  Who is Calling: Patient  Update: Patient desires 90 day supply on her medications and has a new pharmacy.  Please adjust quantity and send to new pharmacy, [facility] [place] #[id number]  Okay to leave a detailed message?:  No return call needed     Patient states she is out of trazodone at this time.

## 2021-06-16 NOTE — PROGRESS NOTES
Assessment/Plan:     1. Ataxia  Per neurology, most concerning for multiple sclerosis though per patient this diagnosis has not yet been made.  I feel it is important for her to follow-up with neurology.  As she is moving, she will need to establish care with the primary care provider and neurologist near her new home.  In the meantime, as she is having difficulty initiating the disability process, will request that our care coordination team contact her to see if they can provide support.  I would be comfortable in initiating any disability paperwork at this time.  - Ambulatory referral to Care Management (Primary Care)    2.  Ulcerative colitis  She will need to establish care with a new gastroenterologist upon arrival to her new home.  Limit NSAIDs.    3.  Depression, single episode, in remission  4.  Anxiety  Continue venlafaxine and bupropion at current doses.  Continue trazodone for sleep.  Advised neuropsychiatric testing.  Address concerns in #1 above as this likely will improve the uncertainty and hopefully also improve depression and anxiety symptoms.      There are no Patient Instructions on file for this visit.     Return in about 4 weeks (around 5/3/2021) for To establish care with a new provider near her new home.      Subjective:      Dory Abrams is a 60 y.o. female presenting to clinic today along with her , Chinmay, for follow-up of ataxia.  Seen by neurology in the fall 2020 due to gait disturbance, balance changes, and proximal muscle weakness associated with some memory difficulties.  Diagnosed with ataxia by neurology.  On my review of her notes they had actually made the diagnosis of multiple sclerosis the patient was not aware of this.  Referral was placed for physical therapy which she has been participating in but not noting improvement.  She never heard back regarding neuropsychiatric testing.  She is due for follow-up MRI in 3 months but has not yet been scheduled for this.  She  "and her  are in the process of moving to [place], will need to establish care with a new neurologist and prefer to transition care there.  Feels her ataxia is worsening.  Continued gait gait difficulties for which she often will require the assistance of another individual or the assistance of a cane outside of her home.  She is no longer driving.  She is no longer working.  Ongoing difficulty finding words at times.  Describes \"blackouts\" that last for 1 to 4 minutes where she will look at you but not respond.  Is not followed by any focal neurologic symptoms and no change in energy level following these episodes.  Intermittent tremors.  She is initiating the disability process but is having difficulty doing so electronically, becoming frustrated, feeling stuck, and requesting help.  States she is not depressed, little bit of anxiety, remains on trazodone but some irregular sleep despite this.  Some days is sleeping for 12 hours a day with naps, other days very poor sleep.    Current Outpatient Medications   Medication Sig Dispense Refill     buPROPion (WELLBUTRIN SR) 150 MG 12 hr tablet TAKE 1 TABLET BY MOUTH TWICE DAILY. OFFICE VISIT REQUIRED FOR FURTHER REFILLS 180 tablet 2     CALCIUM-VITAMIN D3 ORAL Take by mouth Daily at 8:00 am..       cranberry 400 mg cap Take by mouth.       glucosamine/chondr sloan A sod (OSTEO BI-FLEX ORAL) Take by mouth.       naproxen sodium (ALEVE) 220 MG tablet as needed.       traZODone (DESYREL) 50 MG tablet Take between 1-2 tablets ( mg) by mouth at bedtime. 180 tablet 2     venlafaxine (EFFEXOR-XR) 75 MG 24 hr capsule Take 2 capsules (150 mg total) by mouth daily. 180 capsule 1     zinc gluconate 30 mg Tab Take by mouth.       No current facility-administered medications for this visit.        Past Medical History, Family History, and Social History reviewed.  History reviewed. No pertinent past medical history.  Past Surgical History:   Procedure Laterality Date " "    HYSTERECTOMY       LUMBAR PUNCTURE FLUORO GUIDED DIAGNOSTIC  8/10/2020     OOPHORECTOMY       DC APPENDECTOMY      Description: Appendectomy;  Recorded: 09/25/2011;     DC LIGATE FALLOPIAN TUBE      Description: Tubal Ligation;  Recorded: 09/25/2011;     DC REMOVAL COLON/ILEOSTOMY      Description: Total Abdominal Colectomy;  Recorded: 09/25/2011;  Comments: due to Ulcerative Colitis age 21.  \"Hugo Pouch\"     DC REMOVAL GALLBLADDER      Description: Cholecystectomy;  Recorded: 09/25/2011;     DC TOTAL ABDOM HYSTERECTOMY      Description: Total Abdominal Hysterectomy;  Recorded: 11/25/2013;     Aspirin (tartrazine only)  Family History   Problem Relation Age of Onset     Diabetes Father      Diabetes Sister      Social History     Socioeconomic History     Marital status:      Spouse name: Not on file     Number of children: Not on file     Years of education: Not on file     Highest education level: Not on file   Occupational History     Not on file   Social Needs     Financial resource strain: Not on file     Food insecurity     Worry: Not on file     Inability: Not on file     Transportation needs     Medical: Not on file     Non-medical: Not on file   Tobacco Use     Smoking status: Current Every Day Smoker     Packs/day: 1.00     Years: 41.00     Pack years: 41.00     Smokeless tobacco: Never Used   Substance and Sexual Activity     Alcohol use: Not on file     Drug use: Not on file     Sexual activity: Not on file   Lifestyle     Physical activity     Days per week: Not on file     Minutes per session: Not on file     Stress: Not on file   Relationships     Social connections     Talks on phone: Not on file     Gets together: Not on file     Attends Orthodox service: Not on file     Active member of club or organization: Not on file     Attends meetings of clubs or organizations: Not on file     Relationship status: Not on file     Intimate partner violence     Fear of current or ex partner: Not on " "file     Emotionally abused: Not on file     Physically abused: Not on file     Forced sexual activity: Not on file   Other Topics Concern     Not on file   Social History Narrative     Not on file         Review of systems is as stated in HPI, and the remainder of the 10 system review is otherwise negative.    Objective:     Vitals:    04/05/21 1656   BP: 110/68   Pulse: 78   Resp: 20   Temp: 98.2  F (36.8  C)   TempSrc: Oral   SpO2: 97%   Weight: 141 lb 4.8 oz (64.1 kg)   Height: 5' 6\" (1.676 m)    Body mass index is 22.81 kg/m .    Alert female.  Face moves symmetrically.  Able to speak in full sentences.  Mildly unsteady gait.      This note has been dictated using voice recognition software. Any grammatical or context distortions are unintentional and inherent to the the software.   "

## 2021-06-16 NOTE — PROGRESS NOTES
Clinic Care Coordination Contact  [facility]/Voicemail       Clinical Data: Care Coordinator Outreach  Outreach attempted x 2.  Left message on patient's voicemail with call back information and requested return call.  Plan: Care Coordinator will send care coordination introduction letter with care coordinator contact information and explanation of care coordination services via [employer]. Care Coordinator will do no further outreaches at this time.

## 2021-06-18 NOTE — PATIENT INSTRUCTIONS - HE
Patient Instructions by Tahira Baeza PT at 9/29/2020  9:00 AM     Author: Tahira Baeza PT Service: -- Author Type: Physical Therapist    Filed: 9/29/2020  9:39 AM Encounter Date: 9/29/2020 Status: Signed    : Tahira Baeza PT (Physical Therapist)       Return to clinic in one week.     HIP FLEXION - STANDING  -SLR    While standing, raise your leg forward as shown.     Use your arms for support if needed for balance and safety.         HIP ABDUCTION - STANDING     While standing, raise your leg out to the side. Keep your knee straight and maintain your toes pointed forward the entire time.      Use your arms for support if needed for balance and safety.       HIP EXTENSION - STANDING    While standing, move your leg back as shown.    Use your arms for support if needed for balance and safety.          STANDING HEEL RAISES    While standing, raise up on your toes as you lift your heels off the ground.     SIDESTEPPING WALK AT KITCHEN COUNTER DOWN AND BACK 4X  3X/DAY

## 2021-06-21 NOTE — LETTER
Letter by Chelle Upton CHW at      Author: Chelle Upton CHW Service: -- Author Type: --    Filed:  Encounter Date: 4/12/2021 Status: (Other)       CARE COORDINATION  [facility]  [address] [place] [address] [address]  [place] [address]       April 15, 2021    Dory Abrams  [address] [place] [id number]      Dear Dory,    I am a clinic community health worker who works with Ave Mcdonough MD at the [facility]. I have been trying to reach you recently to introduce Clinic Care Coordination and to see if there was anything I could assist you with.  Below is a description of clinic care coordination and how I can further assist you.      The clinic care coordination team is made up of a registered nurse,  and community health worker who understand the health care system. The goal of clinic care coordination is to help you manage your health and improve access to the health care system in the most efficient manner. The team can assist you in meeting your health care goals by providing education, coordinating services, strengthening the communication among your providers and supporting you with any resource needs.    Please feel free to contact me at [phone] with any questions or concerns. We are focused on providing you with the highest-quality healthcare experience possible and that all starts with you.     Sincerely,     Chelle Community Health Worker

## 2021-06-22 NOTE — PROGRESS NOTES
"Assessment/Plan:    1. Viral URI  Rapid strep is negative.  I suspect viral upper respiratory tract infection.  Advised symptomatic cares at this time.  Offered Tessalon Perles for management of cough, however patient declines. Encouraged adequate rest and hydration.  Return to clinic with any persisting or worsening symptoms.  She is comfortable this plan.  - Rapid Strep A Screen-Throat  - Group A Strep, RNA Direct Detection, Throat    2. Visit for screening mammogram  Referral placed for routine mammogram screening.  - Mammo Screening Bilateral; Future    Subjective:    Dory Abrams is a 58-year-old female seen today for evaluation of a sore throat and cough.  Patient reports that her throat started bothering her a couple of days ago.  She did not think much of it until her friend told her that she had been diagnosed with strep throat.  She was with this friend a few days prior to developing her symptoms.  She wants to be sure that she doesn't need to be treated for strep.  She denies any fevers or chills.  Cough is nonproductive.  She has some nasal congestion.  Otherwise no sinus pain, no ear pain.  No additional concerns today. Review of systems is as stated in HPI, and the remainder of the 10 system review is otherwise unremarkable.    Past Medical History, Family History, and Social History reviewed.    Past Surgical History:   Procedure Laterality Date     HYSTERECTOMY       OOPHORECTOMY       TN APPENDECTOMY      Description: Appendectomy;  Recorded: 09/25/2011;     TN LIGATE FALLOPIAN TUBE      Description: Tubal Ligation;  Recorded: 09/25/2011;     TN REMOVAL COLON/ILEOSTOMY      Description: Total Abdominal Colectomy;  Recorded: 09/25/2011;  Comments: due to Ulcerative Colitis age 21.  \"Hugo Pouch\"     TN REMOVAL GALLBLADDER      Description: Cholecystectomy;  Recorded: 09/25/2011;     TN TOTAL ABDOM HYSTERECTOMY      Description: Total Abdominal Hysterectomy;  Recorded: 11/25/2013;        Family " "History   Problem Relation Age of Onset     Diabetes Father      Diabetes Sister         History reviewed. No pertinent past medical history.     Social History     Tobacco Use     Smoking status: Current Every Day Smoker     Packs/day: 1.00     Years: 41.00     Pack years: 41.00     Smokeless tobacco: Never Used   Substance Use Topics     Alcohol use: Not on file     Drug use: Not on file        Current Outpatient Medications   Medication Sig Dispense Refill     alendronate (FOSAMAX) 70 MG tablet Take 1 tablet (70 mg total) by mouth once a week. 12 tablet 2     buPROPion (WELLBUTRIN SR) 150 MG 12 hr tablet Take 1 tablet (150 mg total) by mouth 2 (two) times a day. 180 tablet 0     buPROPion (WELLBUTRIN SR) 150 MG 12 hr tablet Take 1 tablet (150 mg total) by mouth 2 (two) times a day. **OFFICE VISIT REQUIRED FOR FURTHER REFILLS** 60 tablet 0     naproxen sodium (ALEVE) 220 MG tablet as needed.       venlafaxine (EFFEXOR-XR) 75 MG 24 hr capsule Take 2 capsules (150 mg total) by mouth daily. 180 capsule 2     venlafaxine (EFFEXOR-XR) 75 MG 24 hr capsule TAKE 2 CAPSULES BY MOUTH DAILY. 180 capsule 0     No current facility-administered medications for this visit.           Objective:    Vitals:    12/19/18 1122   BP: 130/62   Patient Site: Left Arm   Patient Position: Sitting   Cuff Size: Adult Regular   Pulse: 74   Temp: 98.2  F (36.8  C)   SpO2: 98%   Weight: 151 lb 4.8 oz (68.6 kg)   Height: 5' 6\" (1.676 m)      Body mass index is 24.42 kg/m .      General Appearance:  Alert, afebrile, cooperative, no distress, appears stated age   HEENT:   Moist mucous membranes.  Oropharynx clear without erythema or exudate.  No nasal drainage.  No sinus tenderness on palpation.  TMs pearly and translucent bilaterally. No acute findings.   Neck: Supple, symmetrical, no adenopathy.   Lungs:   Clear to auscultation bilaterally, respirations unlabored.    Heart:  Regular rate and rhythm, S1, S2 normal.   Skin: Warm, dry.  No rashes " or lesions.       This note has been dictated using voice recognition software. Any grammatical or context distortions are unintentional and inherent to the use of this software.

## 2021-06-25 NOTE — TELEPHONE ENCOUNTER
Reason for Call:  Medication or medication refill:    Do you use a [facility]?  Name of the pharmacy and phone number for the current request: [place] pharmacy [place]    Name of the medication requested: Wellbutrin 150mg, trazodone 50mg, venlafaxine 75mg    Other request: NA     Can we leave a detailed message on this number? Yes    Phone number patient can be reached at: Cell number on file:    Telephone Information:   Mobile [phone]       Best Time: anytime     Call taken on 6/7/2021 at 11:32 AM by Ainsley Roman

## 2021-07-03 NOTE — ADDENDUM NOTE
Addendum Note by Naomi Sanchez CNP at 3/31/2020 10:20 AM     Author: Naomi Sanchez CNP Service: -- Author Type: Nurse Practitioner    Filed: 4/1/2020  8:10 AM Encounter Date: 3/31/2020 Status: Signed    : Naomi Sanchez CNP (Nurse Practitioner)    Addended by: NAOMI SANCHEZ on: 4/1/2020 08:10 AM        Modules accepted: Level of Service

## 2021-07-03 NOTE — ADDENDUM NOTE
Addendum Note by Naomi Sanchez CNP at 3/31/2020 10:20 AM     Author: Naomi Sanchez CNP Service: -- Author Type: Nurse Practitioner    Filed: 4/1/2020  8:09 AM Encounter Date: 3/31/2020 Status: Signed    : Naomi Sanchez CNP (Nurse Practitioner)    Addended by: NAOMI SANCHEZ on: 4/1/2020 08:09 AM        Modules accepted: Level of Service

## 2021-08-13 ENCOUNTER — DOCUMENTATION ONLY (OUTPATIENT)
Dept: NEUROLOGY | Facility: CLINIC | Age: 61
End: 2021-08-13

## 2021-08-13 NOTE — PROGRESS NOTES
Ms. Abrams was originally referred for a neuropsychological evaluation on August 2020 by her neurologist, Dr. Padron from [facility]. The patient was originally referred to our colleague, Dameon Bazan, PhD at [facility]; however, Dr. Bazan is no longer employed at [facility]. His waitlist of patients was transferred over to us. Our service reached out to Ms. Abrams about scheduling options. She has been unreachable. As we have now called and/or left messages multiple times, with no response, the order for neuropsychological evaluation will now be deferred.     Ms. Abrams is welcome to contact our service at any time ([phone]) should she wish to schedule this evaluation.    Ainsley Low, PhD, LP, ABPP  Clinical Neuropsychologist  [facility]  [phone]

## 2021-08-29 ENCOUNTER — HEALTH MAINTENANCE LETTER (OUTPATIENT)
Age: 61
End: 2021-08-29

## 2021-10-24 ENCOUNTER — HEALTH MAINTENANCE LETTER (OUTPATIENT)
Age: 61
End: 2021-10-24

## 2022-10-15 ENCOUNTER — HEALTH MAINTENANCE LETTER (OUTPATIENT)
Age: 62
End: 2022-10-15

## 2023-07-17 NOTE — PROGRESS NOTES
Clinic Care Coordination Contact  [facility]/Voicemail       Clinical Data: Care Coordinator Outreach  Outreach attempted x 1.  Left message on patient's voicemail with call back information and requested return call.  Plan:  Care Coordinator will try to reach patient again in 1-2 business days.         If the provider orders tests at today's visit, the patient would like to be contacted via  cell at [phone] and it is ok to leave detailed message on voice mail or with family member.      If medications are ordered at today's visit, the pharmacy name/location patient would like them to be sent to is The requested medication(s) have been refilled.  The refill request(s) was within refill protocol.  Refill(s) were authorized by pharmacist..     There were no vitals taken for this visit.    CURTIS GREEN

## 2023-10-29 ENCOUNTER — HEALTH MAINTENANCE LETTER (OUTPATIENT)
Age: 63
End: 2023-10-29